# Patient Record
Sex: FEMALE | Race: WHITE | NOT HISPANIC OR LATINO | URBAN - METROPOLITAN AREA
[De-identification: names, ages, dates, MRNs, and addresses within clinical notes are randomized per-mention and may not be internally consistent; named-entity substitution may affect disease eponyms.]

---

## 2017-01-30 ENCOUNTER — APPOINTMENT (RX ONLY)
Dept: URBAN - METROPOLITAN AREA CLINIC 349 | Facility: CLINIC | Age: 72
Setting detail: DERMATOLOGY
End: 2017-01-30

## 2017-01-30 DIAGNOSIS — L57.0 ACTINIC KERATOSIS: ICD-10-CM | Status: IMPROVED

## 2017-01-30 DIAGNOSIS — L85.3 XEROSIS CUTIS: ICD-10-CM

## 2017-01-30 DIAGNOSIS — L81.4 OTHER MELANIN HYPERPIGMENTATION: ICD-10-CM

## 2017-01-30 PROCEDURE — ? OTHER

## 2017-01-30 PROCEDURE — 99213 OFFICE O/P EST LOW 20 MIN: CPT | Mod: 25

## 2017-01-30 PROCEDURE — ? OBSERVATION

## 2017-01-30 PROCEDURE — ? LIQUID NITROGEN (COSMETIC)

## 2017-01-30 PROCEDURE — ? TREATMENT REGIMEN

## 2017-01-30 PROCEDURE — ? COUNSELING

## 2017-01-30 PROCEDURE — 17000 DESTRUCT PREMALG LESION: CPT

## 2017-01-30 PROCEDURE — ? LIQUID NITROGEN

## 2017-01-30 ASSESSMENT — LOCATION DETAILED DESCRIPTION DERM
LOCATION DETAILED: RIGHT LATERAL INFERIOR EYELID
LOCATION DETAILED: RIGHT CENTRAL EYEBROW
LOCATION DETAILED: RIGHT SUPERIOR CENTRAL MALAR CHEEK
LOCATION DETAILED: LEFT LATERAL CANTHUS
LOCATION DETAILED: LEFT LATERAL INFERIOR EYELID
LOCATION DETAILED: LEFT CENTRAL EYEBROW
LOCATION DETAILED: RIGHT INFERIOR FOREHEAD

## 2017-01-30 ASSESSMENT — LOCATION SIMPLE DESCRIPTION DERM
LOCATION SIMPLE: RIGHT CHEEK
LOCATION SIMPLE: RIGHT EYEBROW
LOCATION SIMPLE: LEFT INFERIOR EYELID
LOCATION SIMPLE: RIGHT FOREHEAD
LOCATION SIMPLE: LEFT EYELID
LOCATION SIMPLE: LEFT EYEBROW
LOCATION SIMPLE: RIGHT INFERIOR EYELID

## 2017-01-30 ASSESSMENT — LOCATION ZONE DERM
LOCATION ZONE: FACE
LOCATION ZONE: EYELID

## 2017-01-30 NOTE — PROCEDURE: LIQUID NITROGEN (COSMETIC)
Consent: The patient's consent was obtained including but not limited to risks of crusting, scabbing, blistering, scarring, darker or lighter pigmentary change, recurrence, incomplete removal and infection. The patient understands that the procedure is cosmetic in nature and is not covered by insurance.
Detail Level: Detailed
Post-Care Instructions: I reviewed with the patient in detail post-care instructions. Patient is to wear sunprotection, and avoid picking at any of the treated lesions. Pt may apply Vaseline to crusted or scabbing areas.
Price (Use Numbers Only, No Special Characters Or $): 20.00
Render Post-Care Instructions In Note?: no

## 2017-03-16 ENCOUNTER — HOSPITAL ENCOUNTER (OUTPATIENT)
Dept: PHYSICAL THERAPY | Age: 72
Discharge: HOME | End: 2017-03-16
Payer: MEDICARE

## 2017-03-16 PROCEDURE — 97162 PT EVAL MOD COMPLEX 30 MIN: CPT

## 2017-03-16 PROCEDURE — G8979 MOBILITY GOAL STATUS: HCPCS

## 2017-03-16 PROCEDURE — G8978 MOBILITY CURRENT STATUS: HCPCS

## 2017-03-16 NOTE — PROGRESS NOTES
Ambulatory/Rehab Services H2 Model Falls Risk Assessment    Risk Factor Pts. ·   Confusion/Disorientation/Impulsivity  []    4 ·   Symptomatic Depression  []   2 ·   Altered Elimination  []   1 ·   Dizziness/Vertigo  []   1 ·   Gender (Male)  []   1 ·   Any administered antiepileptics (anticonvulsants):  []   2 ·   Any administered benzodiazepines:  []   1 ·   Visual Impairment (specify):  []   1 ·   Portable Oxygen Use  []   1 ·   Orthostatic ? BP  []   1 ·   History of Recent Falls (within 3 mos.)  [x]   5     Ability to Rise from Chair (choose one) Pts. ·   Ability to rise in a single movement  []   0 ·   Pushes up, successful in one attempt  [x]   1 ·   Multiple attempts, but successful  []   3 ·   Unable to rise without assistance  []   4   Total: (5 or greater = High Risk) 6     Falls Prevention Plan:   []                Physical Limitations to Exercise (specify):   []                Mobility Assistance Device (type):   []                Exercise/Equipment Adaptation (specify):    ©2010 [facility] [address] Patent #[id number].  Federal Law prohibits the replication, distribution or use without written permission from [facility] Certain

## 2017-03-16 NOTE — PROGRESS NOTES
Mike Kimbrough  : 1945 [facility] Center at [facility] DIAMOND CHIRINOS  [address] [facility], [address] [place] [address],[address], [place] [address].  [phone]   [phone]         OUTPATIENT PHYSICAL THERAPY:Initial Assessment 3/16/2017    ICD-10: Treatment Diagnosis: pain in right knee (M25.561)  Precautions/Allergies: no known allergies  Fall Risk Score: 5( (? 5 = High Risk)  MD Orders: evaluate and treat MEDICAL/REFERRING DIAGNOSIS:  knee Tear of medial meniscus, current injury , right knee,  Pain in right knee  DATE OF ONSET: 2017  REFERRING PHYSICIAN: Barak Rangel MD  RETURN PHYSICIAN APPOINTMENT:      INITIAL ASSESSMENT:  Ms. Stacey Miranda presents with pain in the R knee after a fall in January. She was diagnosed with a meniscus tear and a stress fracture per her report. She continues to have limitations in her function. She has not been able to return to her walking program or go up and down stairs reciprocally. She would benefit from PT for problems listed below and guided rehab from this injury. PROBLEM LIST (Impacting functional limitations):  1. Decreased Strength  2. Decreased Ambulation Ability/Technique  3. Increased Pain  4. Decreased Activity Tolerance INTERVENTIONS PLANNED:  1. Balance Exercise  2. Gait Training  3. Home Exercise Program (HEP)  4. Therapeutic Exercise/Strengthening   TREATMENT PLAN:  Effective Dates: 3/16/17 TO 17. Frequency/Duration: 2 times a week for 3 weeks ( Initial order is for 3 weeks but goals written for longer.)  GOALS: (Goals have been discussed and agreed upon with patient.)  Short-Term Functional Goals: Time Frame:  2 weeks  1. Pt independent with HEP to address deficits. 2. Pt to report a decrease in her symptoms. Discharge Goals: Time Frame: 8 weeks  1. Pt will ascend and descend stairs reciprocally with one hand rail. 2. Pt to ambulate greater than 1 mile with no increase in her symptoms.   3. R hip abd, R hip ext and R hip flex WNL to support gait activities. 4. Pt independent with high level HEP to maintain goals made in PT.  5. Pt improve LEFS score by 10 or more points indicating improved function. Rehabilitation Potential For Stated Goals: Good  Regarding Ashley Babb therapy, I certify that the treatment plan above will be carried out by a therapist or under their direction. Thank you for this referral,  Gabby Gaytan, PT     Referring Physician Signature: Oralia Khan MD              Date                    The information in this section was collected on 3/16/17 (except where otherwise noted). HISTORY:   History of Present Injury/Illness (Reason for Referral):  Pt fell in January and injured her knee. She was recovering and returning to function but in Feb began having a pain in her posterior knee and was diagnosed meniscus tear and a stress fracture. She had an injection recently and has had good improvement in knee pain. Since that time she complains of R hip pain and lateral thigh pain. She also mentioned that she thought she was having some sciatica as she was having pain from her low back down into her hip. She was unable to go up/ down steps reciprocally today. Past Medical History/Comorbidities:   Pt has asthma. Social History/Living Environment:     lives alone. No stairs  Prior Level of Function/Work/Activity:  Active in exercise classes and walks 2 miles several times a day. Usually around 6 miles a day. She is retired.     Current Medications:  Zocor, singulair, albuterol      Date Last Reviewed:  3/16/2017   Number of Personal Factors/Comorbidities that affect the Plan of Care: 1-2: MODERATE COMPLEXITY   EXAMINATION:   ROM:          Knee ROM WNL    Strength:          Hip abd R- 3/5  L 4/5        Hip flex  R 4/5  L 4/5        Hip ext  R 3-/5  L 4-/5        Knee ext  R 5/5  L 5/5        Knee flex  R 4/5  L 4+/5        Ant tib-  R 4/5  L 5-/5         Neurological Screen:  R HS and R ant tib strength improved with repeated trunk ext in standing. Myotomes:  See above. Dermatomes:  No numbness or tingling. Intact to lt touch. Body Structures Involved:  1. Bones  2. Joints  3. Muscles Body Functions Affected:  1. Sensory/Pain  2. Neuromusculoskeletal Activities and Participation Affected:  1. Mobility   Number of elements (examined above) that affect the Plan of Care: 4+: HIGH COMPLEXITY   CLINICAL PRESENTATION:   Presentation: Evolving clinical presentation with changing clinical characteristics: MODERATE COMPLEXITY   CLINICAL DECISION MAKING:   Outcome Measure: Tool Used: Lower Extremity Functional Scale (LEFS)  Score:  Initial: 52/80 Most Recent: X/80 (Date: -- )   Interpretation of Score: 20 questions each scored on a 5 point scale with 0 representing \"extreme difficulty or unable to perform\" and 4 representing \"no difficulty\". The lower the score, the greater the functional disability. 80/80 represents no disability. Minimal detectable change is 9 points. Score 80 79-63 62-48 47-32 31-16 15-1 0   Modifier CH CI CJ CK CL CM CN     ? Mobility - Walking and Moving Around:     - CURRENT STATUS: CJ - 20%-39% impaired, limited or restricted    - GOAL STATUS: CI - 1%-19% impaired, limited or restricted    - D/C STATUS:  ---------------To be determined---------------    Medical Necessity:   · Patient is expected to demonstrate progress in strength, coordination and functional technique to return to previous level of function. .  Reason for Services/Other Comments:  · Patient continues to require skilled intervention due to inabilty to participate in walking program as done previously and go up /down stairs rediprocally. .   Use of outcome tool(s) and clinical judgement create a POC that gives a: Questionable prediction of patient's progress: MODERATE COMPLEXITY            TREATMENT:   (In addition to Assessment/Re-Assessment sessions the following treatments were rendered)  Pre-treatment Symptoms/Complaints:  Pt states that pain comes on in about half a mile. Pt did not complain of any pain during evaluation except when going up and down stairs. Pain: Initial:     0/10 Post Session:  0/10     Assessment only today, no treatment provided. Treatment/Session Assessment:    · Response to Treatment:  Eval only today. · Compliance with Program/Exercises: Will assess as treatment progresses. · Recommendations/Intent for next treatment session: \"Next visit will focus on stregnthening, functional activities. \".   Total Treatment Duration:  60 min  PT Patient Time In/Time Out  Time In: 0800  Time Out: 0900    Akiko Pérez PT

## 2017-03-21 ENCOUNTER — HOSPITAL ENCOUNTER (OUTPATIENT)
Dept: PHYSICAL THERAPY | Age: 72
Discharge: HOME | End: 2017-03-21
Payer: MEDICARE

## 2017-03-21 PROCEDURE — 97110 THERAPEUTIC EXERCISES: CPT

## 2017-03-21 NOTE — PROGRESS NOTES
Trista Fitzpatrick  : 1945 Therapy Center at [facility] DIAMOND CHIRINOS  [address] [facility], [address] [place] [address],[address], [place] [address].  [phone]   [phone]         OUTPATIENT PHYSICAL THERAPY:Daily Note 3/21/2017    ICD-10: Treatment Diagnosis: pain in right knee (M25.561)  Precautions/Allergies: no known allergies  Fall Risk Score: 5( (? 5 = High Risk)  MD Orders: evaluate and treat MEDICAL/REFERRING DIAGNOSIS:  knee Tear of medial meniscus, current injury , right knee,  Pain in right knee  DATE OF ONSET: 2017  REFERRING PHYSICIAN: Allison Spangler MD  RETURN PHYSICIAN APPOINTMENT:      INITIAL ASSESSMENT:  Ms. Audie Galindo presents with pain in the R knee after a fall in January. She was diagnosed with a meniscus tear and a stress fracture per her report. She continues to have limitations in her function. She has not been able to return to her walking program or go up and down stairs reciprocally. She would benefit from PT for problems listed below and guided rehab from this injury. PROBLEM LIST (Impacting functional limitations):  1. Decreased Strength  2. Decreased Ambulation Ability/Technique  3. Increased Pain  4. Decreased Activity Tolerance INTERVENTIONS PLANNED:  1. Balance Exercise  2. Gait Training  3. Home Exercise Program (HEP)  4. Therapeutic Exercise/Strengthening   TREATMENT PLAN:  Effective Dates: 3/16/17 TO 17. Frequency/Duration: 2 times a week for 3 weeks ( Initial order is for 3 weeks but goals written for longer.)  GOALS: (Goals have been discussed and agreed upon with patient.)  Short-Term Functional Goals: Time Frame:  2 weeks  1. Pt independent with HEP to address deficits. 2. Pt to report a decrease in her symptoms. Discharge Goals: Time Frame: 8 weeks  1. Pt will ascend and descend stairs reciprocally with one hand rail. 2. Pt to ambulate greater than 1 mile with no increase in her symptoms.   3. R hip abd, R hip ext and R hip flex WNL to support gait activities. 4. Pt independent with high level HEP to maintain goals made in PT.  5. Pt improve LEFS score by 10 or more points indicating improved function. Rehabilitation Potential For Stated Goals: Good  Regarding Valente Hernandez therapy, I certify that the treatment plan above will be carried out by a therapist or under their direction. Thank you for this referral,  Alley Harris, PT     Referring Physician Signature: Allison Spangler MD              Date                    The information in this section was collected on 3/16/17 (except where otherwise noted). HISTORY:   History of Present Injury/Illness (Reason for Referral):  Pt fell in January and injured her knee. She was recovering and returning to function but in Feb began having a pain in her posterior knee and was diagnosed meniscus tear and a stress fracture. She had an injection recently and has had good improvement in knee pain. Since that time she complains of R hip pain and lateral thigh pain. She also mentioned that she thought she was having some sciatica as she was having pain from her low back down into her hip. She was unable to go up/ down steps reciprocally today. Past Medical History/Comorbidities:   Pt has asthma. Social History/Living Environment:     lives alone. No stairs  Prior Level of Function/Work/Activity:  Active in exercise classes and walks 2 miles several times a day. Usually around 6 miles a day. She is retired.     Current Medications:  Zocor, singulair, albuterol      Date Last Reviewed:  3/21/2017   Number of Personal Factors/Comorbidities that affect the Plan of Care: 1-2: MODERATE COMPLEXITY   EXAMINATION:   ROM:          Knee ROM WNL    Strength:          Hip abd R- 3/5  L 4/5        Hip flex  R 4/5  L 4/5        Hip ext  R 3-/5  L 4-/5        Knee ext  R 5/5  L 5/5        Knee flex  R 4/5  L 4+/5        Ant tib-  R 4/5  L 5-/5         Neurological Screen:  R HS and R ant tib strength improved with repeated trunk ext in standing. Myotomes:  See above. Dermatomes:  No numbness or tingling. Intact to lt touch. Body Structures Involved:  1. Bones  2. Joints  3. Muscles Body Functions Affected:  1. Sensory/Pain  2. Neuromusculoskeletal Activities and Participation Affected:  1. Mobility   Number of elements (examined above) that affect the Plan of Care: 4+: HIGH COMPLEXITY   CLINICAL PRESENTATION:   Presentation: Evolving clinical presentation with changing clinical characteristics: MODERATE COMPLEXITY   CLINICAL DECISION MAKING:   Outcome Measure: Tool Used: Lower Extremity Functional Scale (LEFS)  Score:  Initial: 52/80 Most Recent: X/80 (Date: -- )   Interpretation of Score: 20 questions each scored on a 5 point scale with 0 representing \"extreme difficulty or unable to perform\" and 4 representing \"no difficulty\". The lower the score, the greater the functional disability. 80/80 represents no disability. Minimal detectable change is 9 points. Score 80 79-63 62-48 47-32 31-16 15-1 0   Modifier CH CI CJ CK CL CM CN     ? Mobility - Walking and Moving Around:     - CURRENT STATUS: CJ - 20%-39% impaired, limited or restricted    - GOAL STATUS: CI - 1%-19% impaired, limited or restricted    - D/C STATUS:  ---------------To be determined---------------    Medical Necessity:   · Patient is expected to demonstrate progress in strength, coordination and functional technique to return to previous level of function. .  Reason for Services/Other Comments:  · Patient continues to require skilled intervention due to inabilty to participate in walking program as done previously and go up /down stairs rediprocally. .   Use of outcome tool(s) and clinical judgement create a POC that gives a: Questionable prediction of patient's progress: MODERATE COMPLEXITY            TREATMENT:   (In addition to Assessment/Re-Assessment sessions the following treatments were rendered)  Pre-treatment Symptoms/Complaints:  Pt states she is not having any pain. She states she has not had the pain behind her knee or in her post thigh since she began doing the exercises. Pain: Initial:     0/10 Post Session:  0/10     Therapeutic Exercise: ( )45 min:  Exercises per grid below to improve mobility and strength. Required minimal visual, verbal and tactile cues to promote proper body alignment, promote proper body posture and promote proper body mechanics. Progressed repetitions as indicated. Date:  3/21/17 Date:   Date:     Activity/Exercise Parameters Parameters Parameters   SLR 3 directions  2 x 15     Glut lifts 1 x 15     Bicycle 5 min level 3     HS Nautilus 25# x 15  30# x 15     bridging 2 x 15     Hip abd in supine 15                 Treatment/Session Assessment:    · Response to Treatment:  DId well with exercises. Hips are weak L>R. Wes Fregosok Requires manual assist for several ex. · Compliance with Program/Exercises: Will assess as treatment progresses. · Recommendations/Intent for next treatment session: \"Next visit will focus on stregnthening, functional activities. \".   Total Treatment Duration:  45 min       Mariela Santo PT

## 2017-03-23 ENCOUNTER — HOSPITAL ENCOUNTER (OUTPATIENT)
Dept: PHYSICAL THERAPY | Age: 72
Discharge: HOME | End: 2017-03-23
Payer: MEDICARE

## 2017-03-23 PROCEDURE — 97110 THERAPEUTIC EXERCISES: CPT

## 2017-03-23 NOTE — PROGRESS NOTES
Dc Ta  : 1945 Therapy Center at [facility] DIAMOND CHIRINOS  [address] [facility], [address] [place] [address],[address], [place] [address].  [phone]   [phone]         OUTPATIENT PHYSICAL THERAPY:Daily Note 3/23/2017    ICD-10: Treatment Diagnosis: pain in right knee (M25.561)  Precautions/Allergies: no known allergies  Fall Risk Score: 5( (? 5 = High Risk)  MD Orders: evaluate and treat MEDICAL/REFERRING DIAGNOSIS:  knee Tear of medial meniscus, current injury , right knee,  Pain in right knee  DATE OF ONSET: 2017  REFERRING PHYSICIAN: Anson Roblero MD  RETURN PHYSICIAN APPOINTMENT:      INITIAL ASSESSMENT:  Ms. Jennifer Castro presents with pain in the R knee after a fall in January. She was diagnosed with a meniscus tear and a stress fracture per her report. She continues to have limitations in her function. She has not been able to return to her walking program or go up and down stairs reciprocally. She would benefit from PT for problems listed below and guided rehab from this injury. PROBLEM LIST (Impacting functional limitations):  1. Decreased Strength  2. Decreased Ambulation Ability/Technique  3. Increased Pain  4. Decreased Activity Tolerance INTERVENTIONS PLANNED:  1. Balance Exercise  2. Gait Training  3. Home Exercise Program (HEP)  4. Therapeutic Exercise/Strengthening   TREATMENT PLAN:  Effective Dates: 3/16/17 TO 17. Frequency/Duration: 2 times a week for 3 weeks ( Initial order is for 3 weeks but goals written for longer.)  GOALS: (Goals have been discussed and agreed upon with patient.)  Short-Term Functional Goals: Time Frame:  2 weeks  1. Pt independent with HEP to address deficits. 2. Pt to report a decrease in her symptoms. Discharge Goals: Time Frame: 8 weeks  1. Pt will ascend and descend stairs reciprocally with one hand rail. 2. Pt to ambulate greater than 1 mile with no increase in her symptoms.   3. R hip abd, R hip ext and R hip flex WNL to support gait activities. 4. Pt independent with high level HEP to maintain goals made in PT.  5. Pt improve LEFS score by 10 or more points indicating improved function. Rehabilitation Potential For Stated Goals: Good  Regarding Bhanu Paulson therapy, I certify that the treatment plan above will be carried out by a therapist or under their direction. Thank you for this referral,  Rosalind Rodrigues PT     Referring Physician Signature: Jaycee Suggs MD              Date                    The information in this section was collected on 3/16/17 (except where otherwise noted). HISTORY:   History of Present Injury/Illness (Reason for Referral):  Pt fell in January and injured her knee. She was recovering and returning to function but in Feb began having a pain in her posterior knee and was diagnosed meniscus tear and a stress fracture. She had an injection recently and has had good improvement in knee pain. Since that time she complains of R hip pain and lateral thigh pain. She also mentioned that she thought she was having some sciatica as she was having pain from her low back down into her hip. She was unable to go up/ down steps reciprocally today. Past Medical History/Comorbidities:   Pt has asthma. Social History/Living Environment:     lives alone. No stairs  Prior Level of Function/Work/Activity:  Active in exercise classes and walks 2 miles several times a day. Usually around 6 miles a day. She is retired.     Current Medications:  Zocor, singulair, albuterol      Date Last Reviewed:  3/23/2017   Number of Personal Factors/Comorbidities that affect the Plan of Care: 1-2: MODERATE COMPLEXITY   EXAMINATION:   ROM:          Knee ROM WNL    Strength:          Hip abd R- 3/5  L 4/5        Hip flex  R 4/5  L 4/5        Hip ext  R 3-/5  L 4-/5        Knee ext  R 5/5  L 5/5        Knee flex  R 4/5  L 4+/5        Ant tib-  R 4/5  L 5-/5         Neurological Screen:  R HS and R ant tib strength improved with repeated trunk ext in standing. Myotomes:  See above. Dermatomes:  No numbness or tingling. Intact to lt touch. Body Structures Involved:  1. Bones  2. Joints  3. Muscles Body Functions Affected:  1. Sensory/Pain  2. Neuromusculoskeletal Activities and Participation Affected:  1. Mobility   Number of elements (examined above) that affect the Plan of Care: 4+: HIGH COMPLEXITY   CLINICAL PRESENTATION:   Presentation: Evolving clinical presentation with changing clinical characteristics: MODERATE COMPLEXITY   CLINICAL DECISION MAKING:   Outcome Measure: Tool Used: Lower Extremity Functional Scale (LEFS)  Score:  Initial: 52/80 Most Recent: X/80 (Date: -- )   Interpretation of Score: 20 questions each scored on a 5 point scale with 0 representing \"extreme difficulty or unable to perform\" and 4 representing \"no difficulty\". The lower the score, the greater the functional disability. 80/80 represents no disability. Minimal detectable change is 9 points. Score 80 79-63 62-48 47-32 31-16 15-1 0   Modifier CH CI CJ CK CL CM CN     ? Mobility - Walking and Moving Around:     - CURRENT STATUS: CJ - 20%-39% impaired, limited or restricted    - GOAL STATUS: CI - 1%-19% impaired, limited or restricted    - D/C STATUS:  ---------------To be determined---------------    Medical Necessity:   · Patient is expected to demonstrate progress in strength, coordination and functional technique to return to previous level of function. .  Reason for Services/Other Comments:  · Patient continues to require skilled intervention due to inabilty to participate in walking program as done previously and go up /down stairs rediprocally. .   Use of outcome tool(s) and clinical judgement create a POC that gives a: Questionable prediction of patient's progress: MODERATE COMPLEXITY            TREATMENT:   (In addition to Assessment/Re-Assessment sessions the following treatments were rendered)  Pre-treatment Symptoms/Complaints:  Pt states she is not having any pain. She states she is not having any pain. Pain: Initial:     0/10 Post Session:  0/10 She complained of pain with hip abd but seemed to be muscular pain. Therapeutic Exercise: ( )45 min:  Exercises per grid below to improve mobility and strength. Required minimal visual, verbal and tactile cues to promote proper body alignment, promote proper body posture and promote proper body mechanics. Progressed repetitions as indicated. Date:  3/21/17 Date:  3/23/17 Date:     Activity/Exercise Parameters Parameters Parameters   SLR 3 directions  2 x 15 SLR 1#  2 x 10  Hip abd requires assist  2 x10  Hip ext 2 x 10    Glut lifts 1 x 15 1 x 15    Bicycle 5 min level 3 5 min level 3    HS Nautilus 25# x 15  30# x 15 Nautilus 30# x 15    bridging 2 x 15     Hip abd in supine 15 With band 15x    Shuttle  50# 2 x 10    Step ups  4' x 10    Single leg stance  Foam  3 x 30sec          Treatment/Session Assessment:    · Response to Treatment:  DId well with exercises. Pt has marked weakness in her hips R>L. She requires manual assist for several exercises and compensates when not monitored. · Compliance with Program/Exercises: compliant with ex at home. · Recommendations/Intent for next treatment session: \"Next visit will focus on stregnthening, functional activities. \".   Total Treatment Duration:  45 min  PT Patient Time In/Time Out  Time In: 0800  Time Out: 4052    Tere Cavazos, PT

## 2017-03-28 ENCOUNTER — HOSPITAL ENCOUNTER (OUTPATIENT)
Dept: PHYSICAL THERAPY | Age: 72
Discharge: HOME | End: 2017-03-28
Payer: MEDICARE

## 2017-03-28 PROCEDURE — 97110 THERAPEUTIC EXERCISES: CPT

## 2017-03-28 NOTE — PROGRESS NOTES
Ron Saini  : 1945 [facility] DIAMOND CHIRINOS  [address] [facility], [address] [place] [address],[address], [place] [address].  [phone]   [phone]         OUTPATIENT PHYSICAL THERAPY:Daily Note 3/28/2017    ICD-10: Treatment Diagnosis: pain in right knee (M25.561)  Precautions/Allergies: no known allergies  Fall Risk Score: 5( (? 5 = High Risk)  MD Orders: evaluate and treat MEDICAL/REFERRING DIAGNOSIS:  knee Tear of medial meniscus, current injury , right knee,  Pain in right knee  DATE OF ONSET: 2017  REFERRING PHYSICIAN: Kristin Chowdary MD  RETURN PHYSICIAN APPOINTMENT:      INITIAL ASSESSMENT:  Ms. Christa Villeda presents with pain in the R knee after a fall in January. She was diagnosed with a meniscus tear and a stress fracture per her report. She continues to have limitations in her function. She has not been able to return to her walking program or go up and down stairs reciprocally. She would benefit from PT for problems listed below and guided rehab from this injury. PROBLEM LIST (Impacting functional limitations):  1. Decreased Strength  2. Decreased Ambulation Ability/Technique  3. Increased Pain  4. Decreased Activity Tolerance INTERVENTIONS PLANNED:  1. Balance Exercise  2. Gait Training  3. Home Exercise Program (HEP)  4. Therapeutic Exercise/Strengthening   TREATMENT PLAN:  Effective Dates: 3/16/17 TO 17. Frequency/Duration: 2 times a week for 3 weeks ( Initial order is for 3 weeks but goals written for longer.)  GOALS: (Goals have been discussed and agreed upon with patient.)  Short-Term Functional Goals: Time Frame:  2 weeks  1. Pt independent with HEP to address deficits. 2. Pt to report a decrease in her symptoms. Discharge Goals: Time Frame: 8 weeks  1. Pt will ascend and descend stairs reciprocally with one hand rail. 2. Pt to ambulate greater than 1 mile with no increase in her symptoms.   3. R hip abd, R hip ext and R hip flex WNL to support gait activities. 4. Pt independent with high level HEP to maintain goals made in PT.  5. Pt improve LEFS score by 10 or more points indicating improved function. Rehabilitation Potential For Stated Goals: Good  Regarding Kady sorto, I certify that the treatment plan above will be carried out by a therapist or under their direction. Thank you for this referral,  Sergo Arita, PT     Referring Physician Signature: Jules Uribe MD              Date                    The information in this section was collected on 3/16/17 (except where otherwise noted). HISTORY:   History of Present Injury/Illness (Reason for Referral):  Pt fell in January and injured her knee. She was recovering and returning to function but in Feb began having a pain in her posterior knee and was diagnosed meniscus tear and a stress fracture. She had an injection recently and has had good improvement in knee pain. Since that time she complains of R hip pain and lateral thigh pain. She also mentioned that she thought she was having some sciatica as she was having pain from her low back down into her hip. She was unable to go up/ down steps reciprocally today. Past Medical History/Comorbidities:   Pt has asthma. Social History/Living Environment:     lives alone. No stairs  Prior Level of Function/Work/Activity:  Active in exercise classes and walks 2 miles several times a day. Usually around 6 miles a day. She is retired.     Current Medications:  Zocor, singulair, albuterol      Date Last Reviewed:  3/28/2017   Number of Personal Factors/Comorbidities that affect the Plan of Care: 1-2: MODERATE COMPLEXITY   EXAMINATION:   ROM:          Knee ROM WNL    Strength:          Hip abd R- 3/5  L 4/5        Hip flex  R 4/5  L 4/5        Hip ext  R 3-/5  L 4-/5        Knee ext  R 5/5  L 5/5        Knee flex  R 4/5  L 4+/5        Ant tib-  R 4/5  L 5-/5         Neurological Screen:  R HS and R ant tib strength improved with repeated trunk ext in standing. Myotomes:  See above. Dermatomes:  No numbness or tingling. Intact to lt touch. Body Structures Involved:  1. Bones  2. Joints  3. Muscles Body Functions Affected:  1. Sensory/Pain  2. Neuromusculoskeletal Activities and Participation Affected:  1. Mobility   Number of elements (examined above) that affect the Plan of Care: 4+: HIGH COMPLEXITY   CLINICAL PRESENTATION:   Presentation: Evolving clinical presentation with changing clinical characteristics: MODERATE COMPLEXITY   CLINICAL DECISION MAKING:   Outcome Measure: Tool Used: Lower Extremity Functional Scale (LEFS)  Score:  Initial: 52/80 Most Recent: X/80 (Date: -- )   Interpretation of Score: 20 questions each scored on a 5 point scale with 0 representing \"extreme difficulty or unable to perform\" and 4 representing \"no difficulty\". The lower the score, the greater the functional disability. 80/80 represents no disability. Minimal detectable change is 9 points. Score 80 79-63 62-48 47-32 31-16 15-1 0   Modifier CH CI CJ CK CL CM CN     ? Mobility - Walking and Moving Around:     - CURRENT STATUS: CJ - 20%-39% impaired, limited or restricted    - GOAL STATUS: CI - 1%-19% impaired, limited or restricted    - D/C STATUS:  ---------------To be determined---------------    Medical Necessity:   · Patient is expected to demonstrate progress in strength, coordination and functional technique to return to previous level of function. .  Reason for Services/Other Comments:  · Patient continues to require skilled intervention due to inabilty to participate in walking program as done previously and go up /down stairs rediprocally. .   Use of outcome tool(s) and clinical judgement create a POC that gives a: Questionable prediction of patient's progress: MODERATE COMPLEXITY            TREATMENT:   (In addition to Assessment/Re-Assessment sessions the following treatments were rendered)  Pre-treatment Symptoms/Complaints:  Pt states she is not having any pain. She states she is not having any pain. Pain: Initial:     1/10 Post Session:  0/10 She complained of pain with hip abd but seemed to be muscular pain. Therapeutic Exercise: ( )45 min:  Exercises per grid below to improve mobility and strength. Required minimal visual, verbal and tactile cues to promote proper body alignment, promote proper body posture and promote proper body mechanics. Progressed repetitions as indicated. Date:  3/21/17 Date:  3/23/17 Date:  3/28   Activity/Exercise Parameters Parameters Parameters   SLR 3 directions  2 x 15 SLR 1#  2 x 10  Hip abd requires assist  2 x10  Hip ext 2 x 10 SLR 1# 2 x 15  Hip abd 0# 2 x 10  Hip ext 2 x 10   Glut lifts 1 x 15 1 x 15 2 x 15   Bicycle 5 min level 3 5 min level 3 5 min level 3   HS Nautilus 25# x 15  30# x 15 Nautilus 30# x 15 Nautilus 40# 2 x 15   bridging 2 x 15     Hip abd in supine 15 With band 15x Blue bland 2 x 15   Shuttle  50# 2 x 10 50# 2 x 10   Step ups  4' x 10 4' x10  4' stepovers x 10   Single leg stance  Foam  3 x 30sec          Treatment/Session Assessment:    · Response to Treatment:  DId well with exercises. Required less assist with abduction today. · Compliance with Program/Exercises: compliant with ex at home. · Recommendations/Intent for next treatment session: \"Next visit will focus on stregnthening, functional activities. \".   Total Treatment Duration:  45 min  PT Patient Time In/Time Out  Time In: 0800  Time Out: 2304    Valdemar Lynn, PT

## 2017-03-30 ENCOUNTER — HOSPITAL ENCOUNTER (OUTPATIENT)
Dept: PHYSICAL THERAPY | Age: 72
Discharge: HOME | End: 2017-03-30
Payer: MEDICARE

## 2017-03-30 PROCEDURE — 97110 THERAPEUTIC EXERCISES: CPT

## 2017-03-30 NOTE — PROGRESS NOTES
Doroteo Moseley  : 1945 [facility]OND CHIRIN[facility]  [address] [facility], [address] [place] [address],[address], [place] [address].  [phone]   [phone]         OUTPATIENT PHYSICAL THERAPY:Daily Note 3/30/2017    ICD-10: Treatment Diagnosis: pain in right knee (M25.561)  Precautions/Allergies: no known allergies  Fall Risk Score: 5( (? 5 = High Risk)  MD Orders: evaluate and treat MEDICAL/REFERRING DIAGNOSIS:  knee Tear of medial meniscus, current injury , right knee,  Pain in right knee  DATE OF ONSET: 2017  REFERRING PHYSICIAN: Will Ramos MD  RETURN PHYSICIAN APPOINTMENT:      INITIAL ASSESSMENT:  Ms. Laurence Ayala presents with pain in the R knee after a fall in January. She was diagnosed with a meniscus tear and a stress fracture per her report. She continues to have limitations in her function. She has not been able to return to her walking program or go up and down stairs reciprocally. She would benefit from PT for problems listed below and guided rehab from this injury. PROBLEM LIST (Impacting functional limitations):  1. Decreased Strength  2. Decreased Ambulation Ability/Technique  3. Increased Pain  4. Decreased Activity Tolerance INTERVENTIONS PLANNED:  1. Balance Exercise  2. Gait Training  3. Home Exercise Program (HEP)  4. Therapeutic Exercise/Strengthening   TREATMENT PLAN:  Effective Dates: 3/16/17 TO 17. Frequency/Duration: 2 times a week for 3 weeks ( Initial order is for 3 weeks but goals written for longer.)  GOALS: (Goals have been discussed and agreed upon with patient.)  Short-Term Functional Goals: Time Frame:  2 weeks  1. Pt independent with HEP to address deficits. 2. Pt to report a decrease in her symptoms. Discharge Goals: Time Frame: 8 weeks  1. Pt will ascend and descend stairs reciprocally with one hand rail. 2. Pt to ambulate greater than 1 mile with no increase in her symptoms.   3. R hip abd, R hip ext and R hip flex WNL to support gait activities. 4. Pt independent with high level HEP to maintain goals made in PT.  5. Pt improve LEFS score by 10 or more points indicating improved function. Rehabilitation Potential For Stated Goals: Good  Regarding Wilmar Valdivia therapy, I certify that the treatment plan above will be carried out by a therapist or under their direction. Thank you for this referral,  Brianna Tam, PT     Referring Physician Signature: Pita Collins MD              Date                    The information in this section was collected on 3/16/17 (except where otherwise noted). HISTORY:   History of Present Injury/Illness (Reason for Referral):  Pt fell in January and injured her knee. She was recovering and returning to function but in Feb began having a pain in her posterior knee and was diagnosed meniscus tear and a stress fracture. She had an injection recently and has had good improvement in knee pain. Since that time she complains of R hip pain and lateral thigh pain. She also mentioned that she thought she was having some sciatica as she was having pain from her low back down into her hip. She was unable to go up/ down steps reciprocally today. Past Medical History/Comorbidities:   Pt has asthma. Social History/Living Environment:     lives alone. No stairs  Prior Level of Function/Work/Activity:  Active in exercise classes and walks 2 miles several times a day. Usually around 6 miles a day. She is retired.     Current Medications:  Zocor, singulair, albuterol      Date Last Reviewed:  3/30/2017   Number of Personal Factors/Comorbidities that affect the Plan of Care: 1-2: MODERATE COMPLEXITY   EXAMINATION:   ROM:          Knee ROM WNL    Strength:          Hip abd R- 3/5  L 4/5        Hip flex  R 4/5  L 4/5        Hip ext  R 3-/5  L 4-/5        Knee ext  R 5/5  L 5/5        Knee flex  R 4/5  L 4+/5        Ant tib-  R 4/5  L 5-/5         Neurological Screen:  R HS and R ant tib strength improved with repeated trunk ext in standing. Myotomes:  See above. Dermatomes:  No numbness or tingling. Intact to lt touch. Body Structures Involved:  1. Bones  2. Joints  3. Muscles Body Functions Affected:  1. Sensory/Pain  2. Neuromusculoskeletal Activities and Participation Affected:  1. Mobility   Number of elements (examined above) that affect the Plan of Care: 4+: HIGH COMPLEXITY   CLINICAL PRESENTATION:   Presentation: Evolving clinical presentation with changing clinical characteristics: MODERATE COMPLEXITY   CLINICAL DECISION MAKING:   Outcome Measure: Tool Used: Lower Extremity Functional Scale (LEFS)  Score:  Initial: 52/80 Most Recent: X/80 (Date: -- )   Interpretation of Score: 20 questions each scored on a 5 point scale with 0 representing \"extreme difficulty or unable to perform\" and 4 representing \"no difficulty\". The lower the score, the greater the functional disability. 80/80 represents no disability. Minimal detectable change is 9 points. Score 80 79-63 62-48 47-32 31-16 15-1 0   Modifier CH CI CJ CK CL CM CN     ? Mobility - Walking and Moving Around:     - CURRENT STATUS: CJ - 20%-39% impaired, limited or restricted    - GOAL STATUS: CI - 1%-19% impaired, limited or restricted    - D/C STATUS:  ---------------To be determined---------------    Medical Necessity:   · Patient is expected to demonstrate progress in strength, coordination and functional technique to return to previous level of function. .  Reason for Services/Other Comments:  · Patient continues to require skilled intervention due to inabilty to participate in walking program as done previously and go up /down stairs rediprocally. .   Use of outcome tool(s) and clinical judgement create a POC that gives a: Questionable prediction of patient's progress: MODERATE COMPLEXITY            TREATMENT:   (In addition to Assessment/Re-Assessment sessions the following treatments were rendered)  Pre-treatment Symptoms/Complaints:  Pt states she is not having any pain. She states she is not having any pain. Pain: Initial:     0/10 Post Session:  0/10 She complained of pain with hip abd but seemed to be muscular pain. Therapeutic Exercise: ( )45 min:  Exercises per grid below to improve mobility and strength. Required minimal visual, verbal and tactile cues to promote proper body alignment, promote proper body posture and promote proper body mechanics. Progressed repetitions as indicated. Date:  3/21/17 Date:  3/23/17 Date:  3/28 Date  3/30   Activity/Exercise Parameters Parameters Parameters    SLR 3 directions  2 x 15 SLR 1#  2 x 10  Hip abd requires assist  2 x10  Hip ext 2 x 10 SLR 1# 2 x 15  Hip abd 0# 2 x 10  Hip ext 2 x 10 SLR 2# 2 x 15  Abd 0# 2 x 10 with assist and then 1 x 10 against wall  Hip ext 2# 2 x10   Glut lifts 1 x 15 1 x 15 2 x 15 2# 2 x15   Bicycle 5 min level 3 5 min level 3 5 min level 3 5 min level 3   HS Nautilus 25# x 15  30# x 15 Nautilus 30# x 15 Nautilus 40# 2 x 15 Nautilus 40#  2 x 15   bridging 2 x 15      Hip abd in supine 15 With band 15x Blue bland 2 x 15    Shuttle  50# 2 x 10 50# 2 x 10 50# 2 x 10   Step ups  4' x 10 4' x10  4' stepovers x 10 6' 2 x 10   Single leg stance  Foam  3 x 30sec         Treatment/Session Assessment:    · Response to Treatment:  She is not having any pain . She has a difficult time accessing her gluts for abduction. Focused on ways that would limit compensation at home with HEP. · Compliance with Program/Exercises: compliant with ex at home. · Recommendations/Intent for next treatment session: \"Next visit will focus on stregnthening, functional activities. \".   Total Treatment Duration:  45 min  PT Patient Time In/Time Out  Time In: 0800  Time Out: 1601    Karely Hubbard, PT

## 2017-04-04 ENCOUNTER — APPOINTMENT (OUTPATIENT)
Dept: PHYSICAL THERAPY | Age: 72
End: 2017-04-04
Payer: MEDICARE

## 2017-04-06 ENCOUNTER — HOSPITAL ENCOUNTER (OUTPATIENT)
Dept: PHYSICAL THERAPY | Age: 72
Discharge: HOME | End: 2017-04-06
Payer: MEDICARE

## 2017-04-06 PROCEDURE — 97110 THERAPEUTIC EXERCISES: CPT

## 2017-04-06 NOTE — PROGRESS NOTES
Chris Winslow  : 1945 [facility]  [address] [facility], [address] [place] [address],[address], [place] [address].  [phone]   [phone]         OUTPATIENT PHYSICAL THERAPY:Daily Note 2017    ICD-10: Treatment Diagnosis: pain in right knee (M25.561)  Precautions/Allergies: no known allergies  Fall Risk Score: 5( (? 5 = High Risk)  MD Orders: evaluate and treat MEDICAL/REFERRING DIAGNOSIS:  knee Tear of medial meniscus, current injury , right knee,  Pain in right knee  DATE OF ONSET: 2017  REFERRING PHYSICIAN: Maxwell Ernandez MD  RETURN PHYSICIAN APPOINTMENT:      INITIAL ASSESSMENT:  Ms. Angie Denis presents with pain in the R knee after a fall in January. She was diagnosed with a meniscus tear and a stress fracture per her report. She continues to have limitations in her function. She has not been able to return to her walking program or go up and down stairs reciprocally. She would benefit from PT for problems listed below and guided rehab from this injury. PROBLEM LIST (Impacting functional limitations):  1. Decreased Strength  2. Decreased Ambulation Ability/Technique  3. Increased Pain  4. Decreased Activity Tolerance INTERVENTIONS PLANNED:  1. Balance Exercise  2. Gait Training  3. Home Exercise Program (HEP)  4. Therapeutic Exercise/Strengthening   TREATMENT PLAN:  Effective Dates: 3/16/17 TO 17. Frequency/Duration: 2 times a week for 3 weeks ( Initial order is for 3 weeks but goals written for longer.)  GOALS: (Goals have been discussed and agreed upon with patient.)  Short-Term Functional Goals: Time Frame:  2 weeks  1. Pt independent with HEP to address deficits. MET  17  2. Pt to report a decrease in her symptoms. MET 17  Discharge Goals: Time Frame: 8 weeks  1. Pt will ascend and descend stairs reciprocally with one hand rail. 2. Pt to ambulate greater than 1 mile with no increase in her symptoms.   3. R hip abd, R hip ext and R hip flex WNL to support gait activities. 4. Pt independent with high level HEP to maintain goals made in PT.  5. Pt improve LEFS score by 10 or more points indicating improved function. Rehabilitation Potential For Stated Goals: Good  Regarding Prateek Liraelman therapy, I certify that the treatment plan above will be carried out by a therapist or under their direction. Thank you for this referral,  Cheyanne Jensen, PT     Referring Physician Signature: Peyton Humphrey MD              Date                    The information in this section was collected on 3/16/17 (except where otherwise noted). HISTORY:   History of Present Injury/Illness (Reason for Referral):  Pt fell in January and injured her knee. She was recovering and returning to function but in Feb began having a pain in her posterior knee and was diagnosed meniscus tear and a stress fracture. She had an injection recently and has had good improvement in knee pain. Since that time she complains of R hip pain and lateral thigh pain. She also mentioned that she thought she was having some sciatica as she was having pain from her low back down into her hip. She was unable to go up/ down steps reciprocally today. Past Medical History/Comorbidities:   Pt has asthma. Social History/Living Environment:     lives alone. No stairs  Prior Level of Function/Work/Activity:  Active in exercise classes and walks 2 miles several times a day. Usually around 6 miles a day. She is retired.     Current Medications:  Zocor, singulair, albuterol      Date Last Reviewed:  4/6/2017   Number of Personal Factors/Comorbidities that affect the Plan of Care: 1-2: MODERATE COMPLEXITY   EXAMINATION:   ROM:          Knee ROM WNL    Strength:          Hip abd R- 3/5  L 4/5        Hip flex  R 4/5  L 4/5        Hip ext  R 3-/5  L 4-/5        Knee ext  R 5/5  L 5/5        Knee flex  R 4/5  L 4+/5        Ant tib-  R 4/5  L 5-/5         Neurological Screen:  R HS and R ant tib strength improved with repeated trunk ext in standing. Myotomes:  See above. Dermatomes:  No numbness or tingling. Intact to lt touch. Body Structures Involved:  1. Bones  2. Joints  3. Muscles Body Functions Affected:  1. Sensory/Pain  2. Neuromusculoskeletal Activities and Participation Affected:  1. Mobility   Number of elements (examined above) that affect the Plan of Care: 4+: HIGH COMPLEXITY   CLINICAL PRESENTATION:   Presentation: Evolving clinical presentation with changing clinical characteristics: MODERATE COMPLEXITY   CLINICAL DECISION MAKING:   Outcome Measure: Tool Used: Lower Extremity Functional Scale (LEFS)  Score:  Initial: 52/80 Most Recent: X/80 (Date: -- )   Interpretation of Score: 20 questions each scored on a 5 point scale with 0 representing \"extreme difficulty or unable to perform\" and 4 representing \"no difficulty\". The lower the score, the greater the functional disability. 80/80 represents no disability. Minimal detectable change is 9 points. Score 80 79-63 62-48 47-32 31-16 15-1 0   Modifier CH CI CJ CK CL CM CN     ? Mobility - Walking and Moving Around:     - CURRENT STATUS: CJ - 20%-39% impaired, limited or restricted    - GOAL STATUS: CI - 1%-19% impaired, limited or restricted    - D/C STATUS:  ---------------To be determined---------------    Medical Necessity:   · Patient is expected to demonstrate progress in strength, coordination and functional technique to return to previous level of function. .  Reason for Services/Other Comments:  · Patient continues to require skilled intervention due to inabilty to participate in walking program as done previously and go up /down stairs rediprocally. .   Use of outcome tool(s) and clinical judgement create a POC that gives a: Questionable prediction of patient's progress: MODERATE COMPLEXITY            TREATMENT:   (In addition to Assessment/Re-Assessment sessions the following treatments were rendered)  Pre-treatment Symptoms/Complaints:  Pt   Pain: Initial:     0/10 Post Session:   No pain after session     Therapeutic Exercise: ( )45 min:  Exercises per grid below to improve mobility and strength. Required minimal visual, verbal and tactile cues to promote proper body alignment, promote proper body posture and promote proper body mechanics. Progressed repetitions as indicated. Date:  3/21/17 Date:  3/23/17 Date:  3/28 Date  3/30 Date  4/7/17   Activity/Exercise Parameters Parameters Parameters     SLR 3 directions  2 x 15 SLR 1#  2 x 10  Hip abd requires assist  2 x10  Hip ext 2 x 10 SLR 1# 2 x 15  Hip abd 0# 2 x 10  Hip ext 2 x 10 SLR 2# 2 x 15  Abd 0# 2 x 10 with assist and then 1 x 10 against wall  Hip ext 2# 2 x10 SLR 2# 2 x 15  Abs 0# 2 x 10  Hip ext 2# 2 x15   Glut lifts 1 x 15 1 x 15 2 x 15 2# 2 x15    Bicycle 5 min level 3 5 min level 3 5 min level 3 5 min level 3 5 min level 3   HS Nautilus 25# x 15  30# x 15 Nautilus 30# x 15 Nautilus 40# 2 x 15 Nautilus 40#  2 x 15 Nautilus 45# 2 x 15   bridging 2 x 15       Hip abd in supine 15 With band 15x Blue bland 2 x 15  Clamshell blue band 2 x 15   Shuttle  50# 2 x 10 50# 2 x 10 50# 2 x 10 62# 2 x 15   Step ups  4' x 10 4' x10  4' stepovers x 10 6' 2 x 10 6' 2 x 10   Single leg stance  Foam  3 x 30sec          Treatment/Session Assessment:    · Response to Treatment:  She is not having any pain . Her hip abductor strength is improving. · Compliance with Program/Exercises: compliant with ex at home. · Recommendations/Intent for next treatment session: \"Next visit will focus on stregnthening, functional activities. \".   Total Treatment Duration:  45 min       Sergo Arita, PT

## 2017-04-25 ENCOUNTER — HOSPITAL ENCOUNTER (OUTPATIENT)
Dept: PHYSICAL THERAPY | Age: 72
Discharge: HOME | End: 2017-04-25
Payer: MEDICARE

## 2017-04-25 PROCEDURE — 97110 THERAPEUTIC EXERCISES: CPT

## 2017-04-25 NOTE — PROGRESS NOTES
Radha Juarez  : 1945 [facility]  [address] [facility], [address] [place] [address],[address], [place] [address].  [phone]   [phone]         OUTPATIENT PHYSICAL THERAPY:Daily Note 2017    ICD-10: Treatment Diagnosis: pain in right knee (M25.561)  Precautions/Allergies: no known allergies  Fall Risk Score: 5( (? 5 = High Risk)  MD Orders: evaluate and treat MEDICAL/REFERRING DIAGNOSIS:  knee Tear of medial meniscus, current injury , right knee,  Pain in right knee  DATE OF ONSET: 2017  REFERRING PHYSICIAN: Peyton Humphrey MD  RETURN PHYSICIAN APPOINTMENT:      INITIAL ASSESSMENT:  Ms. Maria G Bryant presents with pain in the R knee after a fall in January. She was diagnosed with a meniscus tear and a stress fracture per her report. She continues to have limitations in her function. She has not been able to return to her walking program or go up and down stairs reciprocally. She would benefit from PT for problems listed below and guided rehab from this injury. PROBLEM LIST (Impacting functional limitations):  1. Decreased Strength  2. Decreased Ambulation Ability/Technique  3. Increased Pain  4. Decreased Activity Tolerance INTERVENTIONS PLANNED:  1. Balance Exercise  2. Gait Training  3. Home Exercise Program (HEP)  4. Therapeutic Exercise/Strengthening   TREATMENT PLAN:  Effective Dates: 3/16/17 TO 17. Frequency/Duration: 2 times a week for 3 weeks ( Initial order is for 3 weeks but goals written for longer.)  GOALS: (Goals have been discussed and agreed upon with patient.)  Short-Term Functional Goals: Time Frame:  2 weeks  1. Pt independent with HEP to address deficits. MET  17  2. Pt to report a decrease in her symptoms. MET 17  Discharge Goals: Time Frame: 8 weeks  1. Pt will ascend and descend stairs reciprocally with one hand rail. 2. Pt to ambulate greater than 1 mile with no increase in her symptoms.   3. R hip abd, R hip ext and R hip flex WNL to support gait activities. 4. Pt independent with high level HEP to maintain goals made in PT.  5. Pt improve LEFS score by 10 or more points indicating improved function. Rehabilitation Potential For Stated Goals: Good  Regarding Mendez Christianson therapy, I certify that the treatment plan above will be carried out by a therapist or under their direction. Thank you for this referral,  Norman Tavares, PT     Referring Physician Signature: Chriss Hernandez MD              Date                    The information in this section was collected on 3/16/17 (except where otherwise noted). HISTORY:   History of Present Injury/Illness (Reason for Referral):  Pt fell in January and injured her knee. She was recovering and returning to function but in Feb began having a pain in her posterior knee and was diagnosed meniscus tear and a stress fracture. She had an injection recently and has had good improvement in knee pain. Since that time she complains of R hip pain and lateral thigh pain. She also mentioned that she thought she was having some sciatica as she was having pain from her low back down into her hip. She was unable to go up/ down steps reciprocally today. Past Medical History/Comorbidities:   Pt has asthma. Social History/Living Environment:     lives alone. No stairs  Prior Level of Function/Work/Activity:  Active in exercise classes and walks 2 miles several times a day. Usually around 6 miles a day. She is retired.     Current Medications:  Zocor, singulair, albuterol      Date Last Reviewed:  4/25/2017   Number of Personal Factors/Comorbidities that affect the Plan of Care: 1-2: MODERATE COMPLEXITY   EXAMINATION:   ROM:          Knee ROM WNL    Strength:          Hip abd R- 3/5  L 4/5        Hip flex  R 4/5  L 4/5        Hip ext  R 3-/5  L 4-/5        Knee ext  R 5/5  L 5/5        Knee flex  R 4/5  L 4+/5        Ant tib-  R 4/5  L 5-/5         Neurological Screen:  R HS and R ant tib strength improved with repeated trunk ext in standing. Myotomes:  See above. Dermatomes:  No numbness or tingling. Intact to lt touch. Body Structures Involved:  1. Bones  2. Joints  3. Muscles Body Functions Affected:  1. Sensory/Pain  2. Neuromusculoskeletal Activities and Participation Affected:  1. Mobility   Number of elements (examined above) that affect the Plan of Care: 4+: HIGH COMPLEXITY   CLINICAL PRESENTATION:   Presentation: Evolving clinical presentation with changing clinical characteristics: MODERATE COMPLEXITY   CLINICAL DECISION MAKING:   Outcome Measure: Tool Used: Lower Extremity Functional Scale (LEFS)  Score:  Initial: 52/80 Most Recent: X/80 (Date: -- )   Interpretation of Score: 20 questions each scored on a 5 point scale with 0 representing \"extreme difficulty or unable to perform\" and 4 representing \"no difficulty\". The lower the score, the greater the functional disability. 80/80 represents no disability. Minimal detectable change is 9 points. Score 80 79-63 62-48 47-32 31-16 15-1 0   Modifier CH CI CJ CK CL CM CN     ? Mobility - Walking and Moving Around:     - CURRENT STATUS: CJ - 20%-39% impaired, limited or restricted    - GOAL STATUS: CI - 1%-19% impaired, limited or restricted    - D/C STATUS:  ---------------To be determined---------------    Medical Necessity:   · Patient is expected to demonstrate progress in strength, coordination and functional technique to return to previous level of function. .  Reason for Services/Other Comments:  · Patient continues to require skilled intervention due to inabilty to participate in walking program as done previously and go up /down stairs rediprocally. .   Use of outcome tool(s) and clinical judgement create a POC that gives a: Questionable prediction of patient's progress: MODERATE COMPLEXITY            TREATMENT:   (In addition to Assessment/Re-Assessment sessions the following treatments were rendered)  Pre-treatment Symptoms/Complaints:  Pt states she is doing well. She does still complain of difficulty with stairs. Pain: Initial:     0/10 Post Session:   No pain after session     Therapeutic Exercise: ( )45 min:  Exercises per grid below to improve mobility and strength. Required minimal visual, verbal and tactile cues to promote proper body alignment, promote proper body posture and promote proper body mechanics. Progressed repetitions as indicated. Date:  3/21/17 Date:  3/23/17 Date:  3/28 Date  3/30 Date  4/7/17 Date    4/25/17   Activity/Exercise Parameters Parameters Parameters      SLR 3 directions  2 x 15 SLR 1#  2 x 10  Hip abd requires assist  2 x10  Hip ext 2 x 10 SLR 1# 2 x 15  Hip abd 0# 2 x 10  Hip ext 2 x 10 SLR 2# 2 x 15  Abd 0# 2 x 10 with assist and then 1 x 10 against wall  Hip ext 2# 2 x10 SLR 2# 2 x 15  Abs 0# 2 x 10  Hip ext 2# 2 x15 SLR 2# 2 x 15  Abs 0 2 x 15  Hip 2# 2 x 15  Knee flex 2# 2 x 15   Glut lifts 1 x 15 1 x 15 2 x 15 2# 2 x15     Bicycle 5 min level 3 5 min level 3 5 min level 3 5 min level 3 5 min level 3 5 min level 3   HS Nautilus 25# x 15  30# x 15 Nautilus 30# x 15 Nautilus 40# 2 x 15 Nautilus 40#  2 x 15 Nautilus 45# 2 x 15 Nautilus 40# 2 x 15   bridging 2 x 15        Hip abd in supine 15 With band 15x Blue bland 2 x 15  Clamshell blue band 2 x 15    Shuttle  50# 2 x 10 50# 2 x 10 50# 2 x 10 62# 2 x 15 62# 2 x 15   Step ups  4' x 10 4' x10  4' stepovers x 10 6' 2 x 10 6' 2 x 10 6' 2  x15   Single leg stance  Foam  3 x 30sec         Also practiced kneeling on foam as pt has not been kneeling in [employer]. She was able to do well with kneeling. Treatment/Session Assessment:    · Response to Treatment:  Pt did well with all of her exercises. She still has weakness with eccentric control. · Compliance with Program/Exercises: compliant with ex at home. · Recommendations/Intent for next treatment session: \"Next visit will focus on stregnthening, functional activities. \".   Total Treatment Duration:  45 min  PT Patient Time In/Time Out  Time In: 0800  Time Out: 0845    Graciela Reid, PT

## 2017-05-09 ENCOUNTER — HOSPITAL ENCOUNTER (OUTPATIENT)
Dept: PHYSICAL THERAPY | Age: 72
Discharge: HOME | End: 2017-05-09
Payer: MEDICARE

## 2017-05-09 PROCEDURE — 97110 THERAPEUTIC EXERCISES: CPT

## 2017-05-09 NOTE — PROGRESS NOTES
Thelma Subramanian  : 1945 [facility] CHIRIN[facility]  [address] [facility], [address] [place] [address],[address], [place] [address].  [phone]   [phone]         OUTPATIENT PHYSICAL THERAPY:Daily Note 2017    ICD-10: Treatment Diagnosis: pain in right knee (M25.561)  Precautions/Allergies: no known allergies  Fall Risk Score: 5( (? 5 = High Risk)  MD Orders: evaluate and treat MEDICAL/REFERRING DIAGNOSIS:  knee Tear of medial meniscus, current injury , right knee,  Pain in right knee  DATE OF ONSET: 2017  REFERRING PHYSICIAN: Odilon Koroma MD  RETURN PHYSICIAN APPOINTMENT:      INITIAL ASSESSMENT:  Ms. Kal Gaytan presents with pain in the R knee after a fall in January. She was diagnosed with a meniscus tear and a stress fracture per her report. She continues to have limitations in her function. She has not been able to return to her walking program or go up and down stairs reciprocally. She would benefit from PT for problems listed below and guided rehab from this injury. PROBLEM LIST (Impacting functional limitations):  1. Decreased Strength  2. Decreased Ambulation Ability/Technique  3. Increased Pain  4. Decreased Activity Tolerance INTERVENTIONS PLANNED:  1. Balance Exercise  2. Gait Training  3. Home Exercise Program (HEP)  4. Therapeutic Exercise/Strengthening   TREATMENT PLAN:  Effective Dates: 3/16/17 TO 17. Frequency/Duration: 2 times a week for 3 weeks ( Initial order is for 3 weeks but goals written for longer.)  GOALS: (Goals have been discussed and agreed upon with patient.)  Short-Term Functional Goals: Time Frame:  2 weeks  1. Pt independent with HEP to address deficits. MET  17  2. Pt to report a decrease in her symptoms. MET 17  Discharge Goals: Time Frame: 8 weeks  1. Pt will ascend and descend stairs reciprocally with one hand rail. 2. Pt to ambulate greater than 1 mile with no increase in her symptoms.   3. R hip abd, R hip ext and R hip flex WNL to support gait activities. 4. Pt independent with high level HEP to maintain goals made in PT.  5. Pt improve LEFS score by 10 or more points indicating improved function. Rehabilitation Potential For Stated Goals: Good  Regarding Nathaly Aranda therapy, I certify that the treatment plan above will be carried out by a therapist or under their direction. Thank you for this referral,  Usman Webb, PT     Referring Physician Signature: José Miguel Rankin MD              Date                    The information in this section was collected on 3/16/17 (except where otherwise noted). HISTORY:   History of Present Injury/Illness (Reason for Referral):  Pt fell in January and injured her knee. She was recovering and returning to function but in Feb began having a pain in her posterior knee and was diagnosed meniscus tear and a stress fracture. She had an injection recently and has had good improvement in knee pain. Since that time she complains of R hip pain and lateral thigh pain. She also mentioned that she thought she was having some sciatica as she was having pain from her low back down into her hip. She was unable to go up/ down steps reciprocally today. Past Medical History/Comorbidities:   Pt has asthma. Social History/Living Environment:     lives alone. No stairs  Prior Level of Function/Work/Activity:  Active in exercise classes and walks 2 miles several times a day. Usually around 6 miles a day. She is retired.     Current Medications:  Zocor, singulair, albuterol      Date Last Reviewed:  5/9/2017   Number of Personal Factors/Comorbidities that affect the Plan of Care: 1-2: MODERATE COMPLEXITY   EXAMINATION:   ROM:          Knee ROM WNL    Strength:          Hip abd R- 3/5  L 4/5        Hip flex  R 4/5  L 4/5        Hip ext  R 3-/5  L 4-/5        Knee ext  R 5/5  L 5/5        Knee flex  R 4/5  L 4+/5        Ant tib-  R 4/5  L 5-/5         Neurological Screen:  R HS and R ant tib strength improved with repeated trunk ext in standing. Myotomes:  See above. Dermatomes:  No numbness or tingling. Intact to lt touch. Body Structures Involved:  1. Bones  2. Joints  3. Muscles Body Functions Affected:  1. Sensory/Pain  2. Neuromusculoskeletal Activities and Participation Affected:  1. Mobility   Number of elements (examined above) that affect the Plan of Care: 4+: HIGH COMPLEXITY   CLINICAL PRESENTATION:   Presentation: Evolving clinical presentation with changing clinical characteristics: MODERATE COMPLEXITY   CLINICAL DECISION MAKING:   Outcome Measure: Tool Used: Lower Extremity Functional Scale (LEFS)  Score:  Initial: 52/80 Most Recent: X/80 (Date: -- )   Interpretation of Score: 20 questions each scored on a 5 point scale with 0 representing \"extreme difficulty or unable to perform\" and 4 representing \"no difficulty\". The lower the score, the greater the functional disability. 80/80 represents no disability. Minimal detectable change is 9 points. Score 80 79-63 62-48 47-32 31-16 15-1 0   Modifier CH CI CJ CK CL CM CN     ? Mobility - Walking and Moving Around:     - CURRENT STATUS: CJ - 20%-39% impaired, limited or restricted    - GOAL STATUS: CI - 1%-19% impaired, limited or restricted    - D/C STATUS:  ---------------To be determined---------------    Medical Necessity:   · Patient is expected to demonstrate progress in strength, coordination and functional technique to return to previous level of function. .  Reason for Services/Other Comments:  · Patient continues to require skilled intervention due to inabilty to participate in walking program as done previously and go up /down stairs rediprocally. .   Use of outcome tool(s) and clinical judgement create a POC that gives a: Questionable prediction of patient's progress: MODERATE COMPLEXITY            TREATMENT:   (In addition to Assessment/Re-Assessment sessions the following treatments were rendered)  Pre-treatment Symptoms/Complaints:  Pt states she still feels it but it is not really a pain. She was away and wore different shoes and had to do more stairs. Pain: Initial:     0/10 Post Session:   No pain after session     Therapeutic Exercise: ( )45 min:  Exercises per grid below to improve mobility and strength. Required minimal visual, verbal and tactile cues to promote proper body alignment, promote proper body posture and promote proper body mechanics. Progressed repetitions as indicated. Date:  3/21/17 Date:  3/23/17 Date:  3/28 Date  3/30 Date  4/7/17 Date    4/25/17 Date  5/9/17   Activity/Exercise Parameters Parameters Parameters       SLR 3 directions  2 x 15 SLR 1#  2 x 10  Hip abd requires assist  2 x10  Hip ext 2 x 10 SLR 1# 2 x 15  Hip abd 0# 2 x 10  Hip ext 2 x 10 SLR 2# 2 x 15  Abd 0# 2 x 10 with assist and then 1 x 10 against wall  Hip ext 2# 2 x10 SLR 2# 2 x 15  Abs 0# 2 x 10  Hip ext 2# 2 x15 SLR 2# 2 x 15  Abs 0 2 x 15  Hip 2# 2 x 15  Knee flex 2# 2 x 15 Slr 2#  2 x 15  Abd against wall not weight 3 x 7   Glut lifts 1 x 15 1 x 15 2 x 15 2# 2 x15      Bicycle 5 min level 3 5 min level 3 5 min level 3 5 min level 3 5 min level 3 5 min level 3 5 min level 4   HS Nautilus 25# x 15  30# x 15 Nautilus 30# x 15 Nautilus 40# 2 x 15 Nautilus 40#  2 x 15 Nautilus 45# 2 x 15 Nautilus 40# 2 x 15 Nautilus 40# 2 x 15   bridging 2 x 15         Hip abd in supine 15 With band 15x Blue bland 2 x 15  Clamshell blue band 2 x 15     Shuttle  50# 2 x 10 50# 2 x 10 50# 2 x 10 62# 2 x 15 62# 2 x 15 62# 2 x 15   Step ups  4' x 10 4' x10  4' stepovers x 10 6' 2 x 10 6' 2 x 10 6' 2  x15 Reverse 4' 2 x 10  Lateral stepdowns 2 x 10   Single leg stance  Foam  3 x 30sec            Treatment/Session Assessment:    · Response to Treatment:  Pt did well with all of her exercises. She continues to have some weakness and she states she is afraid to kneel and still avoids steps.   She missed last week of therapy due to being out of town.  · Compliance with Program/Exercises: compliant with ex at home. · Recommendations/Intent for next treatment session: \"Next visit will focus on stregnthening, functional activities. \".   Total Treatment Duration:  45 min  PT Patient Time In/Time Out  Time In: 0800  Time Out: 8726    Kathe Simmonds, PT

## 2017-05-11 ENCOUNTER — HOSPITAL ENCOUNTER (OUTPATIENT)
Dept: PHYSICAL THERAPY | Age: 72
Discharge: HOME | End: 2017-05-11
Payer: MEDICARE

## 2017-05-11 PROCEDURE — 97110 THERAPEUTIC EXERCISES: CPT

## 2017-05-11 NOTE — PROGRESS NOTES
Ron Dos Santosotilia  : 1945 [facility] at [facility] C[facility]  [address], [address] [place] [address],[address], [facility] [address].  [phone]   [phone]         OUTPATIENT PHYSICAL THERAPY:Daily Note 2017    ICD-10: Treatment Diagnosis: pain in right knee (M25.561)  Precautions/Allergies: no known allergies  Fall Risk Score: 5( (? 5 = High Risk)  MD Orders: evaluate and treat MEDICAL/REFERRING DIAGNOSIS:  knee Tear of medial meniscus, current injury , right knee,  Pain in right knee  DATE OF ONSET: 2017  REFERRING PHYSICIAN: Kristin Chowdary MD  RETURN PHYSICIAN APPOINTMENT:      INITIAL ASSESSMENT:  Ms. Christa Villeda presents with pain in the R knee after a fall in January. She was diagnosed with a meniscus tear and a stress fracture per her report. She continues to have limitations in her function. She has not been able to return to her walking program or go up and down stairs reciprocally. She would benefit from PT for problems listed below and guided rehab from this injury. PROBLEM LIST (Impacting functional limitations):  1. Decreased Strength  2. Decreased Ambulation Ability/Technique  3. Increased Pain  4. Decreased Activity Tolerance INTERVENTIONS PLANNED:  1. Balance Exercise  2. Gait Training  3. Home Exercise Program (HEP)  4. Therapeutic Exercise/Strengthening   TREATMENT PLAN:  Effective Dates: 3/16/17 TO 17. Frequency/Duration: 2 times a week for 3 weeks ( Initial order is for 3 weeks but goals written for longer.)  GOALS: (Goals have been discussed and agreed upon with patient.)  Short-Term Functional Goals: Time Frame:  2 weeks  1. Pt independent with HEP to address deficits. MET  17  2. Pt to report a decrease in her symptoms. MET 17  Discharge Goals: Time Frame: 8 weeks  1. Pt will ascend and descend stairs reciprocally with one hand rail. 2. Pt to ambulate greater than 1 mile with no increase in her symptoms.   3. R hip abd, R hip ext and R hip flex WNL to support gait activities. 4. Pt independent with high level HEP to maintain goals made in PT.  5. Pt improve LEFS score by 10 or more points indicating improved function. Rehabilitation Potential For Stated Goals: Good  Regarding Dank Carson therapy, I certify that the treatment plan above will be carried out by a therapist or under their direction. Thank you for this referral,  Ayad Mancia PT     Referring Physician Signature: Sierra Sheldon MD              Date                    The information in this section was collected on 3/16/17 (except where otherwise noted). HISTORY:   History of Present Injury/Illness (Reason for Referral):  Pt fell in January and injured her knee. She was recovering and returning to function but in Feb began having a pain in her posterior knee and was diagnosed meniscus tear and a stress fracture. She had an injection recently and has had good improvement in knee pain. Since that time she complains of R hip pain and lateral thigh pain. She also mentioned that she thought she was having some sciatica as she was having pain from her low back down into her hip. She was unable to go up/ down steps reciprocally today. Past Medical History/Comorbidities:   Pt has asthma. Social History/Living Environment:     lives alone. No stairs  Prior Level of Function/Work/Activity:  Active in exercise classes and walks 2 miles several times a day. Usually around 6 miles a day. She is retired.     Current Medications:  Zocor, singulair, albuterol      Date Last Reviewed:  5/11/2017   Number of Personal Factors/Comorbidities that affect the Plan of Care: 1-2: MODERATE COMPLEXITY   EXAMINATION:   ROM:          Knee ROM WNL    Strength:          Hip abd R- 3/5  L 4/5        Hip flex  R 4/5  L 4/5        Hip ext  R 3-/5  L 4-/5        Knee ext  R 5/5  L 5/5        Knee flex  R 4/5  L 4+/5        Ant tib-  R 4/5  L 5-/5         Neurological Screen:  R HS and R ant tib strength improved with repeated trunk ext in standing. Myotomes:  See above. Dermatomes:  No numbness or tingling. Intact to lt touch. Body Structures Involved:  1. Bones  2. Joints  3. Muscles Body Functions Affected:  1. Sensory/Pain  2. Neuromusculoskeletal Activities and Participation Affected:  1. Mobility   Number of elements (examined above) that affect the Plan of Care: 4+: HIGH COMPLEXITY   CLINICAL PRESENTATION:   Presentation: Evolving clinical presentation with changing clinical characteristics: MODERATE COMPLEXITY   CLINICAL DECISION MAKING:   Outcome Measure: Tool Used: Lower Extremity Functional Scale (LEFS)  Score:  Initial: 52/80 Most Recent: X/80 (Date: -- )   Interpretation of Score: 20 questions each scored on a 5 point scale with 0 representing \"extreme difficulty or unable to perform\" and 4 representing \"no difficulty\". The lower the score, the greater the functional disability. 80/80 represents no disability. Minimal detectable change is 9 points. Score 80 79-63 62-48 47-32 31-16 15-1 0   Modifier CH CI CJ CK CL CM CN     ? Mobility - Walking and Moving Around:     - CURRENT STATUS: CJ - 20%-39% impaired, limited or restricted    - GOAL STATUS: CI - 1%-19% impaired, limited or restricted    - D/C STATUS:  ---------------To be determined---------------    Medical Necessity:   · Patient is expected to demonstrate progress in strength, coordination and functional technique to return to previous level of function. .  Reason for Services/Other Comments:  · Patient continues to require skilled intervention due to inabilty to participate in walking program as done previously and go up /down stairs rediprocally. .   Use of outcome tool(s) and clinical judgement create a POC that gives a: Questionable prediction of patient's progress: MODERATE COMPLEXITY            TREATMENT:   (In addition to Assessment/Re-Assessment sessions the following treatments were rendered)  Pre-treatment Symptoms/Complaints:  Pt is pleased that her knee quieted down quickly after being flared up. Pain: Initial:     0/10 Post Session:   No pain after session     Therapeutic Exercise: ( )45 min:  Exercises per grid below to improve mobility and strength. Required minimal visual, verbal and tactile cues to promote proper body alignment, promote proper body posture and promote proper body mechanics. Progressed repetitions as indicated. Date  5/9/17 Date  5/11/17   Activity/Exercise     SLR 3 directions Slr 2#  2 x 15  Abd against wall not weight 3 x 7 SLR 2# 2 x 15  abd against wall 2 x 10   Glut lifts     Bicycle 5 min level 4 5 min level 4   HS Nautilus 40# 2 x 15 Nautilus 40# 2 x 15   bridging     Hip abd in supine     Shuttle 62# 2 x 15 62# 2 x 15   Step ups Reverse 4' 2 x 10  Lateral stepdowns 2 x 10 Reverse 4' 2 x 10  Lateral step downs 2 x 10   Lateral stepping  Blue band 4 x 10 feet   Lunge onto Bosu  2 x 10            Treatment/Session Assessment:    · Response to Treatment:  Instability noted with lunge. Continues to do well with strengthening. No complaint of knee symptoms. · Compliance with Program/Exercises: compliant with ex at home. · Recommendations/Intent for next treatment session: \"Next visit will focus on stregnthening, functional activities. \".   Total Treatment Duration:  45 min       Shireen Murdock PT

## 2017-05-16 ENCOUNTER — HOSPITAL ENCOUNTER (OUTPATIENT)
Dept: PHYSICAL THERAPY | Age: 72
Discharge: HOME | End: 2017-05-16
Payer: MEDICARE

## 2017-05-16 PROCEDURE — 97110 THERAPEUTIC EXERCISES: CPT

## 2017-05-16 PROCEDURE — G8978 MOBILITY CURRENT STATUS: HCPCS

## 2017-05-16 PROCEDURE — G8979 MOBILITY GOAL STATUS: HCPCS

## 2017-05-16 NOTE — PROGRESS NOTES
Rodrigo Escisreal  : 1945 [facility]  [address] [facility], [address], [facility] [address].  [phone]   [phone]         OUTPATIENT PHYSICAL THERAPY:Daily Note and Progress Report 2017    ICD-10: Treatment Diagnosis: pain in right knee (M25.561)  Precautions/Allergies: no known allergies  Fall Risk Score: 5( (? 5 = High Risk)  MD Orders: evaluate and treat MEDICAL/REFERRING DIAGNOSIS:  knee Tear of medial meniscus, current injury , right knee,  Pain in right knee  DATE OF ONSET: 2017  REFERRING PHYSICIAN: Sofia Gordon MD  RETURN PHYSICIAN APPOINTMENT:      INITIAL ASSESSMENT:  Ms. Rohan Yeung presents with pain in the R knee after a fall in January. Pt has been seen for 10 visits of PT and has reached goals as below. Plan is to discharge her to a HEP in a few more visits. PROBLEM LIST (Impacting functional limitations):  1. Decreased Strength  2. Decreased Ambulation Ability/Technique  3. Increased Pain  4. Decreased Activity Tolerance INTERVENTIONS PLANNED:  1. Balance Exercise  2. Gait Training  3. Home Exercise Program (HEP)  4. Therapeutic Exercise/Strengthening   TREATMENT PLAN:  Effective Dates: 3/16/17 TO 17. Frequency/Duration: 2 times a week for 3 weeks ( Initial order is for 3 weeks but goals written for longer.)  GOALS: (Goals have been discussed and agreed upon with patient.)  Short-Term Functional Goals: Time Frame:  2 weeks  1. Pt independent with HEP to address deficits. MET  4.  2. Pt to report a decrease in her symptoms. MET 17  Discharge Goals: Time Frame: 8 weeks  1. Pt will ascend and descend stairs reciprocally with one hand rail. MET 17   2. Pt to ambulate greater than 1 mile with no increase in her symptoms. Not tested  3. R hip abd, R hip ext and R hip flex WNL to support gait activities. ongoing  4. Pt independent with high level HEP to maintain goals made in PT.  ongoing  5.  Pt improve LEFS score by 10 or more points indicating improved function. MET 5/16/17  Rehabilitation Potential For Stated Goals: Good  Regarding Dank Carson therapy, I certify that the treatment plan above will be carried out by a therapist or under their direction. Thank you for this referral,  Ayad Mancia PT     Referring Physician Signature: Sierra Sheldon MD              Date                    The information in this section was collected on 3/16/17 (except where otherwise noted). HISTORY:   History of Present Injury/Illness (Reason for Referral):  Pt fell in January and injured her knee. She was recovering and returning to function but in Feb began having a pain in her posterior knee and was diagnosed meniscus tear and a stress fracture. She had an injection recently and has had good improvement in knee pain. Since that time she complains of R hip pain and lateral thigh pain. She also mentioned that she thought she was having some sciatica as she was having pain from her low back down into her hip. She was unable to go up/ down steps reciprocally today. Past Medical History/Comorbidities:   Pt has asthma. Social History/Living Environment:     lives alone. No stairs  Prior Level of Function/Work/Activity:  Active in exercise classes and walks 2 miles several times a day. Usually around 6 miles a day. She is retired. Current Medications:  Zocor, singulair, albuterol      Date Last Reviewed:  5/16/2017   Number of Personal Factors/Comorbidities that affect the Plan of Care: 1-2: MODERATE COMPLEXITY   EXAMINATION:   ROM:          Knee ROM WNL    Strength:          Hip abd R- 3/5  L 4/5        Hip flex  R 4/5  L 4/5        Hip ext  R 3-/5  L 4-/5        Knee ext  R 5/5  L 5/5        Knee flex  R 4/5  L 4+/5        Ant tib-  R 4/5  L 5-/5         Neurological Screen:            Myotomes:  See above. Dermatomes:  No numbness or tingling. Intact to lt touch.    Body Structures Involved:  1. Bones  2. Joints  3. Muscles Body Functions Affected:  1. Sensory/Pain  2. Neuromusculoskeletal Activities and Participation Affected:  1. Mobility   Number of elements (examined above) that affect the Plan of Care: 4+: HIGH COMPLEXITY   CLINICAL PRESENTATION:   Presentation: Evolving clinical presentation with changing clinical characteristics: MODERATE COMPLEXITY   CLINICAL DECISION MAKING:   Outcome Measure: Tool Used: Lower Extremity Functional Scale (LEFS)  Score:  Initial: 52/80 Most Recent: 74/80 (Date: -- )   Interpretation of Score: 20 questions each scored on a 5 point scale with 0 representing \"extreme difficulty or unable to perform\" and 4 representing \"no difficulty\". The lower the score, the greater the functional disability. 80/80 represents no disability. Minimal detectable change is 9 points. Score 80 79-63 62-48 47-32 31-16 15-1 0   Modifier CH CI CJ CK CL CM CN     ? Mobility - Walking and Moving Around:     - CURRENT STATUS: CI - 1%-19% impaired, limited or restricted    - GOAL STATUS: CI - 1%-19% impaired, limited or restricted    - D/C STATUS:  ---------------To be determined---------------    Medical Necessity:   · Patient is expected to demonstrate progress in strength, coordination and functional technique to return to previous level of function. .  Reason for Services/Other Comments:  · Patient continues to require skilled intervention due to inabilty to participate in walking program as done previously and go up /down stairs rediprocally. .   Use of outcome tool(s) and clinical judgement create a POC that gives a: Questionable prediction of patient's progress: MODERATE COMPLEXITY            TREATMENT:   (In addition to Assessment/Re-Assessment sessions the following treatments were rendered)  Pre-treatment Symptoms/Complaints:  Pt continues to report an awareness in her knee but she would not call it pain.   Pain: Initial:     0/10 Post Session:   No pain after session     Therapeutic Exercise: ( )45 min:  Exercises per grid below to improve mobility and strength. Required minimal visual, verbal and tactile cues to promote proper body alignment, promote proper body posture and promote proper body mechanics. Progressed repetitions as indicated. Date  5/9/17 Date  5/11/17 Date  5/16/17   Activity/Exercise      SLR 3 directions Slr 2#  2 x 15  Abd against wall not weight 3 x 7 SLR 2# 2 x 15  abd against wall 2 x 10 SLR 2# 2 x 15   Glut lifts   2 x 15 2#   Bicycle 5 min level 4 5 min level 4 5 min level 4   HS Nautilus 40# 2 x 15 Nautilus 40# 2 x 15 Nautilus 40# 2 x 15   bridging      Hip abd in supine      Shuttle 62# 2 x 15 62# 2 x 15 62# 2 x 15   Step ups Reverse 4' 2 x 10  Lateral stepdowns 2 x 10 Reverse 4' 2 x 10  Lateral step downs 2 x 10 Reverse 4' 2 x 10  Lateral step downs 2 x 10   Lateral stepping  Blue band 4 x 10 feet Black band 4 x 12 feet   Lunge onto Bosu  2 x 10 2 x 10             Treatment/Session Assessment:    · Response to Treatment:  Pt continues to do well with strengthening. LEFS score has improved from 52 to 74 indicating big improvement in function. · Compliance with Program/Exercises: compliant with ex at home. · Recommendations/Intent for next treatment session: \"Next visit will focus on stregnthening, functional activities. \".   Total Treatment Duration:  45 min       Valdemar Lynn, PT

## 2017-05-18 ENCOUNTER — HOSPITAL ENCOUNTER (OUTPATIENT)
Dept: PHYSICAL THERAPY | Age: 72
Discharge: HOME | End: 2017-05-18
Payer: MEDICARE

## 2017-05-18 PROCEDURE — 97110 THERAPEUTIC EXERCISES: CPT

## 2017-05-18 NOTE — PROGRESS NOTES
Aftab Connelly  : 1945 [facility]  [address] [facility], [address] [place] [address],[address], [address] [facility]  [phone]   [phone]         OUTPATIENT PHYSICAL THERAPY:Daily Note 2017    ICD-10: Treatment Diagnosis: pain in right knee (M25.561)  Precautions/Allergies: no known allergies  Fall Risk Score: 5( (? 5 = High Risk)  MD Orders: evaluate and treat MEDICAL/REFERRING DIAGNOSIS:  knee Tear of medial meniscus, current injury , right knee,  Pain in right knee  DATE OF ONSET: 2017  REFERRING PHYSICIAN: Felecia Sibley MD  RETURN PHYSICIAN APPOINTMENT:      INITIAL ASSESSMENT:  Ms. Cyrus Negrete presents with pain in the R knee after a fall in January. Pt has been seen for 10 visits of PT and has reached goals as below. Plan is to discharge her to a HEP in a few more visits. PROBLEM LIST (Impacting functional limitations):  1. Decreased Strength  2. Decreased Ambulation Ability/Technique  3. Increased Pain  4. Decreased Activity Tolerance INTERVENTIONS PLANNED:  1. Balance Exercise  2. Gait Training  3. Home Exercise Program (HEP)  4. Therapeutic Exercise/Strengthening   TREATMENT PLAN:  Effective Dates: 3/16/17 TO 17. Frequency/Duration: 2 times a week for 3 weeks ( Initial order is for 3 weeks but goals written for longer.)  GOALS: (Goals have been discussed and agreed upon with patient.)  Short-Term Functional Goals: Time Frame:  2 weeks  1. Pt independent with HEP to address deficits. MET  4.  2. Pt to report a decrease in her symptoms. MET 17  Discharge Goals: Time Frame: 8 weeks  1. Pt will ascend and descend stairs reciprocally with one hand rail. MET 17   2. Pt to ambulate greater than 1 mile with no increase in her symptoms. Not tested  3. R hip abd, R hip ext and R hip flex WNL to support gait activities. ongoing  4. Pt independent with high level HEP to maintain goals made in PT.  ongoing  5.  Pt improve LEFS score by 10 or more points indicating improved function. MET 5/16/17  Rehabilitation Potential For Stated Goals: Good  Regarding [address] therapy, I certify that the treatment plan above will be carried out by a therapist or under their direction. Thank you for this referral,  Cheyanne Jensen PT     Referring Physician Signature: Peyton Humphrey MD              Date                    The information in this section was collected on 3/16/17 (except where otherwise noted). HISTORY:   History of Present Injury/Illness (Reason for Referral):  Pt fell in January and injured her knee. She was recovering and returning to function but in Feb began having a pain in her posterior knee and was diagnosed meniscus tear and a stress fracture. She had an injection recently and has had good improvement in knee pain. Since that time she complains of R hip pain and lateral thigh pain. She also mentioned that she thought she was having some sciatica as she was having pain from her low back down into her hip. She was unable to go up/ down steps reciprocally today. Past Medical History/Comorbidities:   Pt has asthma. Social History/Living Environment:     lives alone. No stairs  Prior Level of Function/Work/Activity:  Active in exercise classes and walks 2 miles several times a day. Usually around 6 miles a day. She is retired. Current Medications:  Zocor, singulair, albuterol      Date Last Reviewed:  5/18/2017   Number of Personal Factors/Comorbidities that affect the Plan of Care: 1-2: MODERATE COMPLEXITY   EXAMINATION:   ROM:          Knee ROM WNL    Strength:          Hip abd R- 3/5  L 4/5        Hip flex  R 4/5  L 4/5        Hip ext  R 3-/5  L 4-/5        Knee ext  R 5/5  L 5/5        Knee flex  R 4/5  L 4+/5        Ant tib-  R 4/5  L 5-/5         Neurological Screen:            Myotomes:  See above. Dermatomes:  No numbness or tingling. Intact to lt touch. Body Structures Involved:  1. Bones  2. Joints  3.  Muscles Body Functions Affected:  1. Sensory/Pain  2. Neuromusculoskeletal Activities and Participation Affected:  1. Mobility   Number of elements (examined above) that affect the Plan of Care: 4+: HIGH COMPLEXITY   CLINICAL PRESENTATION:   Presentation: Evolving clinical presentation with changing clinical characteristics: MODERATE COMPLEXITY   CLINICAL DECISION MAKING:   Outcome Measure: Tool Used: Lower Extremity Functional Scale (LEFS)  Score:  Initial: 52/80 Most Recent: 74/80 (Date: -- )   Interpretation of Score: 20 questions each scored on a 5 point scale with 0 representing \"extreme difficulty or unable to perform\" and 4 representing \"no difficulty\". The lower the score, the greater the functional disability. 80/80 represents no disability. Minimal detectable change is 9 points. Score 80 79-63 62-48 47-32 31-16 15-1 0   Modifier CH CI CJ CK CL CM CN     ? Mobility - Walking and Moving Around:     - CURRENT STATUS: CI - 1%-19% impaired, limited or restricted    - GOAL STATUS: CI - 1%-19% impaired, limited or restricted    - D/C STATUS:  ---------------To be determined---------------    Medical Necessity:   · Patient is expected to demonstrate progress in strength, coordination and functional technique to return to previous level of function. .  Reason for Services/Other Comments:  · Patient continues to require skilled intervention due to inabilty to participate in walking program as done previously and go up /down stairs rediprocally. .   Use of outcome tool(s) and clinical judgement create a POC that gives a: Questionable prediction of patient's progress: MODERATE COMPLEXITY            TREATMENT:   (In addition to Assessment/Re-Assessment sessions the following treatments were rendered)  Pre-treatment Symptoms/Complaints:  She reports not real pain.   Pain: Initial:     0/10 Post Session:   No pain after session     Therapeutic Exercise: ( )45 min:  Exercises per grid below to improve mobility and strength. Required minimal visual, verbal and tactile cues to promote proper body alignment, promote proper body posture and promote proper body mechanics. Progressed repetitions as indicated. Date  5/9/17 Date  5/11/17 Date  5/18/17   Activity/Exercise      SLR 3 directions Slr 2#  2 x 15  Abd against wall not weight 3 x 7 SLR 2# 2 x 15  abd against wall 2 x 10 SLR 2# 2 x 15   Glut lifts   2 x 15 2#   Bicycle 5 min level 4 5 min level 4 5 min level 4   HS Nautilus 40# 2 x 15 Nautilus 40# 2 x 15 Nautilus 40# 2 x 15   bridging      Hip abd in supine      Shuttle 62# 2 x 15 62# 2 x 15 62# 2 x 15   Step ups Reverse 4' 2 x 10  Lateral stepdowns 2 x 10 Reverse 4' 2 x 10  Lateral step downs 2 x 10 Reverse 4' 2 x 10  Lateral step downs 2 x 10   Lateral stepping  Blue band 4 x 10 feet Black band 4 x 12 feet   Lunge onto Bosu  2 x 10 2 x 10             Treatment/Session Assessment:    · Response to Treatment:  Pt is pleased with her progress. · Compliance with Program/Exercises: compliant with ex at home. · Recommendations/Intent for next treatment session: \"Next visit will focus on stregnthening, functional activities. \".   Total Treatment Duration:  45 min  PT Patient Time In/Time Out  Time In: 0930  Time Out: 1015    Ayad Mancia PT

## 2017-05-23 ENCOUNTER — HOSPITAL ENCOUNTER (OUTPATIENT)
Dept: PHYSICAL THERAPY | Age: 72
Discharge: HOME | End: 2017-05-23
Payer: MEDICARE

## 2017-05-25 ENCOUNTER — APPOINTMENT (OUTPATIENT)
Dept: PHYSICAL THERAPY | Age: 72
End: 2017-05-25
Payer: MEDICARE

## 2017-05-31 NOTE — PROGRESS NOTES
Isaac Laguna  : 1945 [facility]IR[facility]  [address] [facility], [address] [place] [address],[address], [address] [facility]  [phone]   [phone]         OUTPATIENT PHYSICAL THERAPY:Discontinuation Summary 2017    ICD-10: Treatment Diagnosis: pain in right knee (M25.561)  Precautions/Allergies: no known allergies  Fall Risk Score: 5( (? 5 = High Risk)  MD Orders: evaluate and treat MEDICAL/REFERRING DIAGNOSIS:  knee Tear of medial meniscus, current injury , right knee,  Pain in right knee  DATE OF ONSET: 2017  REFERRING PHYSICIAN: Abiodun Pompa MD  RETURN PHYSICIAN APPOINTMENT:      INITIAL ASSESSMENT:  Ms. Eve Rojas presents with pain in the R knee after a fall in January. Pt has been seen for 10 visits of PT and has reached goals as below. Plan is to discharge her to a HEP in a few more visits. Pt cancelled last 2 visits and will be discharged at this time. PROBLEM LIST (Impacting functional limitations):  1. Decreased Strength  2. Decreased Ambulation Ability/Technique  3. Increased Pain  4. Decreased Activity Tolerance INTERVENTIONS PLANNED:  1. Balance Exercise  2. Gait Training  3. Home Exercise Program (HEP)  4. Therapeutic Exercise/Strengthening   TREATMENT PLAN:  Effective Dates: 3/16/17 TO 17. Frequency/Duration: 2 times a week for 3 weeks ( Initial order is for 3 weeks but goals written for longer.)  GOALS: (Goals have been discussed and agreed upon with patient.)  Short-Term Functional Goals: Time Frame:  2 weeks  1. Pt independent with HEP to address deficits. MET  17  2. Pt to report a decrease in her symptoms. MET 17  Discharge Goals: Time Frame: 8 weeks  1. Pt will ascend and descend stairs reciprocally with one hand rail. MET 17   2. Pt to ambulate greater than 1 mile with no increase in her symptoms. Not tested  3. R hip abd, R hip ext and R hip flex WNL to support gait activities. ongoing  4.  Pt independent with high level HEP to maintain goals made in PT.  ongoing  5. Pt improve LEFS score by 10 or more points indicating improved function. MET 5/16/17  Rehabilitation Potential For Stated Goals: Good  Regarding [place] therapy, I certify that the treatment plan above will be carried out by a therapist or under their direction. Thank you for this referral,  Anurag Tanner, PT     Referring Physician Signature: Ash Fair MD              Date                    The information in this section was collected on 3/16/17 (except where otherwise noted). HISTORY:   History of Present Injury/Illness (Reason for Referral):  Pt fell in January and injured her knee. She was recovering and returning to function but in Feb began having a pain in her posterior knee and was diagnosed meniscus tear and a stress fracture. She had an injection recently and has had good improvement in knee pain. Since that time she complains of R hip pain and lateral thigh pain. She also mentioned that she thought she was having some sciatica as she was having pain from her low back down into her hip. She was unable to go up/ down steps reciprocally today. Past Medical History/Comorbidities:   Pt has asthma. Social History/Living Environment:     lives alone. No stairs  Prior Level of Function/Work/Activity:  Active in exercise classes and walks 2 miles several times a day. Usually around 6 miles a day. She is retired. Current Medications:  Zocor, singulair, albuterol      Date Last Reviewed:  5/31/2017   Number of Personal Factors/Comorbidities that affect the Plan of Care: 1-2: MODERATE COMPLEXITY   EXAMINATION:   ROM:          Knee ROM WNL    Strength:          Hip abd R- 3/5  L 4/5        Hip flex  R 4/5  L 4/5        Hip ext  R 3-/5  L 4-/5        Knee ext  R 5/5  L 5/5        Knee flex  R 4/5  L 4+/5        Ant tib-  R 4/5  L 5-/5         Neurological Screen:            Myotomes:  See above. Dermatomes:  No numbness or tingling.   Intact to lt touch.   Body Structures Involved:  1. Bones  2. Joints  3. Muscles Body Functions Affected:  1. Sensory/Pain  2. Neuromusculoskeletal Activities and Participation Affected:  1. Mobility   Number of elements (examined above) that affect the Plan of Care: 4+: HIGH COMPLEXITY   CLINICAL PRESENTATION:   Presentation: Evolving clinical presentation with changing clinical characteristics: MODERATE COMPLEXITY   CLINICAL DECISION MAKING:   Outcome Measure: Tool Used: Lower Extremity Functional Scale (LEFS)  Score:  Initial: 52/80 Most Recent: 74/80 (Date: -- )   Interpretation of Score: 20 questions each scored on a 5 point scale with 0 representing \"extreme difficulty or unable to perform\" and 4 representing \"no difficulty\". The lower the score, the greater the functional disability. 80/80 represents no disability. Minimal detectable change is 9 points. Score 80 79-63 62-48 47-32 31-16 15-1 0   Modifier CH CI CJ CK CL CM CN     ? Mobility - Walking and Moving Around:     - CURRENT STATUS: CI - 1%-19% impaired, limited or restricted    - GOAL STATUS: CI - 1%-19% impaired, limited or restricted    - D/C STATUS:  ---------------To be determined---------------    Medical Necessity:   · Patient is expected to demonstrate progress in strength, coordination and functional technique to return to previous level of function. .  Reason for Services/Other Comments:  · Patient continues to require skilled intervention due to inabilty to participate in walking program as done previously and go up /down stairs rediprocally. .   Use of outcome tool(s) and clinical judgement create a POC that gives a: Questionable prediction of patient's progress: MODERATE COMPLEXITY

## 2017-08-31 NOTE — PROGRESS NOTES
Dorothea Clark  : 1945 [facility]  [address] [facility], [address], [place] [address].  [phone]   [phone]         OUTPATIENT PHYSICAL THERAPY:Daily Note and Discontinuation Summary 2017    ICD-10: Treatment Diagnosis: pain in right knee (M25.561)  Precautions/Allergies: no known allergies  Fall Risk Score: 5( (? 5 = High Risk)  MD Orders: evaluate and treat MEDICAL/REFERRING DIAGNOSIS:  knee Tear of medial meniscus, current injury , right knee,  Pain in right knee  DATE OF ONSET: 2017  REFERRING PHYSICIAN: Jose Anglin MD  RETURN PHYSICIAN APPOINTMENT:      INITIAL ASSESSMENT:  Ms. Julianne Del Rio presents with pain in the R knee after a fall in January. Pt has been seen for 11 visits of PT and has reached goals as below. She cancelled her last visit so was not reassessed. She will be discharged at this time. PROBLEM LIST (Impacting functional limitations):  1. Decreased Strength  2. Decreased Ambulation Ability/Technique  3. Increased Pain  4. Decreased Activity Tolerance INTERVENTIONS PLANNED:  1. Balance Exercise  2. Gait Training  3. Home Exercise Program (HEP)  4. Therapeutic Exercise/Strengthening   TREATMENT PLAN:  Effective Dates: 3/16/17 TO 17. Frequency/Duration: 2 times a week for 3 weeks ( Initial order is for 3 weeks but goals written for longer.)  GOALS: (Goals have been discussed and agreed upon with patient.)  Short-Term Functional Goals: Time Frame:  2 weeks  1. Pt independent with HEP to address deficits. MET  .  2. Pt to report a decrease in her symptoms. MET 17  Discharge Goals: Time Frame: 8 weeks  1. Pt will ascend and descend stairs reciprocally with one hand rail. MET 17   2. Pt to ambulate greater than 1 mile with no increase in her symptoms. Not tested  3. R hip abd, R hip ext and R hip flex WNL to support gait activities. ongoing  4.  Pt independent with high level HEP to maintain goals made in PT. ongoing  5. Pt improve LEFS score by 10 or more points indicating improved function. MET 5/16/17  Rehabilitation Potential For Stated Goals: Good  Regarding Vivia Inks therapy, I certify that the treatment plan above will be carried out by a therapist or under their direction.   Thank you for this referral,  Hillary Booker, PT     Referring Physician Signature: Cosme Alvarado MD              Date

## 2017-09-13 ENCOUNTER — APPOINTMENT (RX ONLY)
Dept: URBAN - METROPOLITAN AREA CLINIC 349 | Facility: CLINIC | Age: 72
Setting detail: DERMATOLOGY
End: 2017-09-13

## 2017-09-13 DIAGNOSIS — L82.0 INFLAMED SEBORRHEIC KERATOSIS: ICD-10-CM

## 2017-09-13 DIAGNOSIS — L20.89 OTHER ATOPIC DERMATITIS: ICD-10-CM | Status: WELL CONTROLLED

## 2017-09-13 DIAGNOSIS — L57.0 ACTINIC KERATOSIS: ICD-10-CM | Status: IMPROVED

## 2017-09-13 DIAGNOSIS — D22 MELANOCYTIC NEVI: ICD-10-CM | Status: STABLE

## 2017-09-13 PROBLEM — D22.71 MELANOCYTIC NEVI OF RIGHT LOWER LIMB, INCLUDING HIP: Status: ACTIVE | Noted: 2017-09-13

## 2017-09-13 PROBLEM — L20.84 INTRINSIC (ALLERGIC) ECZEMA: Status: ACTIVE | Noted: 2017-09-13

## 2017-09-13 PROBLEM — E78.5 HYPERLIPIDEMIA, UNSPECIFIED: Status: ACTIVE | Noted: 2017-09-13

## 2017-09-13 PROBLEM — D22.61 MELANOCYTIC NEVI OF RIGHT UPPER LIMB, INCLUDING SHOULDER: Status: ACTIVE | Noted: 2017-09-13

## 2017-09-13 PROBLEM — D22.62 MELANOCYTIC NEVI OF LEFT UPPER LIMB, INCLUDING SHOULDER: Status: ACTIVE | Noted: 2017-09-13

## 2017-09-13 PROBLEM — J45.909 UNSPECIFIED ASTHMA, UNCOMPLICATED: Status: ACTIVE | Noted: 2017-09-13

## 2017-09-13 PROBLEM — D22.72 MELANOCYTIC NEVI OF LEFT LOWER LIMB, INCLUDING HIP: Status: ACTIVE | Noted: 2017-09-13

## 2017-09-13 PROBLEM — D22.5 MELANOCYTIC NEVI OF TRUNK: Status: ACTIVE | Noted: 2017-09-13

## 2017-09-13 PROCEDURE — ? OBSERVATION

## 2017-09-13 PROCEDURE — ? BODY PHOTOGRAPHY

## 2017-09-13 PROCEDURE — ? MEDICAL PHOTOGRAPHY REVIEW

## 2017-09-13 PROCEDURE — ? LIQUID NITROGEN

## 2017-09-13 PROCEDURE — ? COUNSELING

## 2017-09-13 PROCEDURE — ? PRESCRIPTION

## 2017-09-13 PROCEDURE — 17000 DESTRUCT PREMALG LESION: CPT | Mod: 59

## 2017-09-13 PROCEDURE — 99214 OFFICE O/P EST MOD 30 MIN: CPT | Mod: 25

## 2017-09-13 PROCEDURE — 17110 DESTRUCTION B9 LES UP TO 14: CPT

## 2017-09-13 RX ORDER — DESOXIMETASONE 2.5 MG/G
OINTMENT TOPICAL
Qty: 1 | Refills: 2 | Status: ERX | COMMUNITY
Start: 2017-09-13

## 2017-09-13 RX ADMIN — DESOXIMETASONE: 2.5 OINTMENT TOPICAL at 12:31

## 2017-09-13 ASSESSMENT — SEVERITY ASSESSMENT: SEVERITY: ALMOST CLEAR

## 2017-09-13 ASSESSMENT — LOCATION SIMPLE DESCRIPTION DERM
LOCATION SIMPLE: RIGHT 5TH TOE
LOCATION SIMPLE: ABDOMEN
LOCATION SIMPLE: LEFT POSTERIOR THIGH
LOCATION SIMPLE: LEFT FOREARM
LOCATION SIMPLE: RIGHT LOWER BACK
LOCATION SIMPLE: RIGHT FOREARM
LOCATION SIMPLE: LEFT PRETIBIAL REGION
LOCATION SIMPLE: RIGHT POSTERIOR UPPER ARM
LOCATION SIMPLE: LEFT POSTERIOR UPPER ARM
LOCATION SIMPLE: RIGHT POSTERIOR THIGH
LOCATION SIMPLE: RIGHT PRETIBIAL REGION
LOCATION SIMPLE: RIGHT EYEBROW

## 2017-09-13 ASSESSMENT — LOCATION ZONE DERM
LOCATION ZONE: LEG
LOCATION ZONE: FACE
LOCATION ZONE: TOE
LOCATION ZONE: TRUNK
LOCATION ZONE: ARM

## 2017-09-13 ASSESSMENT — LOCATION DETAILED DESCRIPTION DERM
LOCATION DETAILED: RIGHT DISTAL DORSAL FOREARM
LOCATION DETAILED: LEFT PROXIMAL DORSAL FOREARM
LOCATION DETAILED: RIGHT PROXIMAL POSTERIOR UPPER ARM
LOCATION DETAILED: RIGHT PROXIMAL POSTERIOR THIGH
LOCATION DETAILED: LEFT DISTAL PRETIBIAL REGION
LOCATION DETAILED: RIGHT CENTRAL EYEBROW
LOCATION DETAILED: RIGHT DISTAL PRETIBIAL REGION
LOCATION DETAILED: LEFT PROXIMAL POSTERIOR UPPER ARM
LOCATION DETAILED: RIGHT SUPERIOR MEDIAL LOWER BACK
LOCATION DETAILED: LEFT PROXIMAL POSTERIOR THIGH
LOCATION DETAILED: SUBXIPHOID
LOCATION DETAILED: RIGHT MEDIAL 5TH TOE

## 2017-09-13 ASSESSMENT — PAIN INTENSITY VAS: HOW INTENSE IS YOUR PAIN 0 BEING NO PAIN, 10 BEING THE MOST SEVERE PAIN POSSIBLE?: NO PAIN

## 2017-09-13 NOTE — PROCEDURE: LIQUID NITROGEN
Post-Care Instructions: I reviewed with the patient in detail post-care instructions. Patient is to wear sunprotection, and avoid picking at any of the treated lesions. Pt may apply Vaseline to crusted or scabbing areas.
Medical Necessity Information: It is in your best interest to select a reason for this procedure from the list below. All of these items fulfill various CMS LCD requirements except the new and changing color options.
Render Post-Care Instructions In Note?: yes
Render Post-Care Instructions In Note?: no
Medical Necessity Clause: This procedure was medically necessary because the lesions that were treated were:
Consent: The patient's consent was obtained including but not limited to risks of crusting, scabbing, blistering, scarring, darker or lighter pigmentary change, recurrence, incomplete removal and infection.
Detail Level: Detailed
Number Of Freeze-Thaw Cycles: 2 freeze-thaw cycles
Duration Of Freeze Thaw-Cycle (Seconds): 0

## 2017-09-13 NOTE — PROCEDURE: BODY PHOTOGRAPHY
Reason For Photography: The patient is obtaining whole body photography to observe existing suspicious moles and or monitor for the appearance of any new lesions.
Detail Level: Detailed
Consent: Written consent obtained, risks reviewed for whole body photography. Patient understands that photograph costs may not be covered by insurance, and patient is ultimately responsible for payment.
Number Of Photographs (Optional- Will Not Render If 0): 1
Whole Body Statement: The whole body was photographed today.
Was The Entire Body Photographed (Cannot Bill Unless Entire Body Photographed)?: No

## 2017-09-13 NOTE — PROCEDURE: MEDICAL PHOTOGRAPHY REVIEW
Number Of Photographs Reviewed: 1
Detail Level: Detailed
Review Findings: no new or changing lesions

## 2018-03-14 ENCOUNTER — APPOINTMENT (RX ONLY)
Dept: URBAN - METROPOLITAN AREA CLINIC 349 | Facility: CLINIC | Age: 73
Setting detail: DERMATOLOGY
End: 2018-03-14

## 2018-03-14 DIAGNOSIS — D22 MELANOCYTIC NEVI: ICD-10-CM

## 2018-03-14 DIAGNOSIS — L20.89 OTHER ATOPIC DERMATITIS: ICD-10-CM | Status: STABLE

## 2018-03-14 DIAGNOSIS — L57.0 ACTINIC KERATOSIS: ICD-10-CM

## 2018-03-14 PROBLEM — D22.71 MELANOCYTIC NEVI OF RIGHT LOWER LIMB, INCLUDING HIP: Status: ACTIVE | Noted: 2018-03-14

## 2018-03-14 PROBLEM — L20.84 INTRINSIC (ALLERGIC) ECZEMA: Status: ACTIVE | Noted: 2018-03-14

## 2018-03-14 PROCEDURE — A4550 SURGICAL TRAYS: HCPCS

## 2018-03-14 PROCEDURE — 99213 OFFICE O/P EST LOW 20 MIN: CPT | Mod: 25

## 2018-03-14 PROCEDURE — ? OTHER

## 2018-03-14 PROCEDURE — ? SHAVE REMOVAL

## 2018-03-14 PROCEDURE — 11306 SHAVE SKIN LESION 0.6-1.0 CM: CPT

## 2018-03-14 PROCEDURE — ? PRESCRIPTION

## 2018-03-14 PROCEDURE — ? COUNSELING

## 2018-03-14 RX ORDER — DESOXIMETASONE 2.5 MG/G
CREAM TOPICAL
Qty: 1 | Refills: 2 | Status: ERX | COMMUNITY
Start: 2018-03-14

## 2018-03-14 RX ADMIN — DESOXIMETASONE: 2.5 CREAM TOPICAL at 14:34

## 2018-03-14 ASSESSMENT — PAIN INTENSITY VAS: HOW INTENSE IS YOUR PAIN 0 BEING NO PAIN, 10 BEING THE MOST SEVERE PAIN POSSIBLE?: NO PAIN

## 2018-03-14 ASSESSMENT — LOCATION DETAILED DESCRIPTION DERM
LOCATION DETAILED: RIGHT DISTAL DORSAL FOREARM
LOCATION DETAILED: RIGHT MEDIAL 5TH TOE
LOCATION DETAILED: LEFT DISTAL PRETIBIAL REGION
LOCATION DETAILED: LEFT PROXIMAL DORSAL FOREARM
LOCATION DETAILED: RIGHT DISTAL PRETIBIAL REGION
LOCATION DETAILED: RIGHT CENTRAL EYEBROW

## 2018-03-14 ASSESSMENT — LOCATION SIMPLE DESCRIPTION DERM
LOCATION SIMPLE: RIGHT FOREARM
LOCATION SIMPLE: RIGHT 5TH TOE
LOCATION SIMPLE: RIGHT PRETIBIAL REGION
LOCATION SIMPLE: LEFT PRETIBIAL REGION
LOCATION SIMPLE: RIGHT EYEBROW
LOCATION SIMPLE: LEFT FOREARM

## 2018-03-14 ASSESSMENT — SEVERITY ASSESSMENT: SEVERITY: MILD

## 2018-03-14 ASSESSMENT — LOCATION ZONE DERM
LOCATION ZONE: ARM
LOCATION ZONE: LEG
LOCATION ZONE: FACE
LOCATION ZONE: TOE

## 2018-03-14 NOTE — PROCEDURE: SHAVE REMOVAL
Post-Care Instructions: I reviewed with the patient in detail post-care instructions. Patient is to keep the biopsy site dry overnight, and then apply Vaseline daily until healed. Patient may apply hydrogen peroxide soaks to remove any crusting. After the procedure, the patient was oriented to person, place, and time. Patient denied feeling dizzy, queasy, and and declined further observation after initial 5 minute observation time.
Medical Necessity Clause: This procedure was medically necessary because the lesion has a history of change
Consent was obtained from the patient. The risks and benefits to therapy were discussed in detail. Specifically, the risks of infection, scarring, bleeding, prolonged wound healing, incomplete removal, allergy to anesthesia, nerve injury and recurrence were addressed. Prior to the procedure, the treatment site was clearly identified and confirmed by the patient. All components of Universal Protocol/PAUSE Rule completed.
Bill For Surgical Tray: yes
Size Of Lesion In Cm (Required): 0.6
Detail Level: Detailed
X Size Of Lesion In Cm (Optional): 0
Anesthesia Volume In Cc: 1.5
Hemostasis: Aluminum Chloride
Anesthesia Type: 1% lidocaine with epinephrine and a 1:10 solution of 8.4% sodium bicarbonate
Wound Care: Vaseline
Billing Type: Third-Party Bill
Biopsy Method: Dermablade
Bill 19454 For Specimen Handling/Conveyance To Laboratory?: no
Accession #: path cons
Notification Instructions: Patient will be notified of biopsy results. However, patient instructed to call the office if not contacted within 2 weeks.
Medical Necessity Information: It is in your best interest to select a reason for this procedure from the list below. All of these items fulfill various CMS LCD requirements except the new and changing color options.

## 2018-03-14 NOTE — PROCEDURE: OTHER
Note Text (......Xxx Chief Complaint.): This diagnosis correlates with the
Other (Free Text): Compared to photo from previous visit and AK resolved with cryo treatment.
Detail Level: Detailed

## 2018-09-19 ENCOUNTER — APPOINTMENT (RX ONLY)
Dept: URBAN - METROPOLITAN AREA CLINIC 349 | Facility: CLINIC | Age: 73
Setting detail: DERMATOLOGY
End: 2018-09-19

## 2018-09-19 DIAGNOSIS — L57.0 ACTINIC KERATOSIS: ICD-10-CM

## 2018-09-19 DIAGNOSIS — L20.89 OTHER ATOPIC DERMATITIS: ICD-10-CM | Status: WELL CONTROLLED

## 2018-09-19 DIAGNOSIS — D22 MELANOCYTIC NEVI: ICD-10-CM | Status: STABLE

## 2018-09-19 PROBLEM — D22.5 MELANOCYTIC NEVI OF TRUNK: Status: ACTIVE | Noted: 2018-09-19

## 2018-09-19 PROBLEM — J45.909 UNSPECIFIED ASTHMA, UNCOMPLICATED: Status: ACTIVE | Noted: 2018-09-19

## 2018-09-19 PROBLEM — L20.84 INTRINSIC (ALLERGIC) ECZEMA: Status: ACTIVE | Noted: 2018-09-19

## 2018-09-19 PROBLEM — E78.5 HYPERLIPIDEMIA, UNSPECIFIED: Status: ACTIVE | Noted: 2018-09-19

## 2018-09-19 PROBLEM — D22.71 MELANOCYTIC NEVI OF RIGHT LOWER LIMB, INCLUDING HIP: Status: ACTIVE | Noted: 2018-09-19

## 2018-09-19 PROCEDURE — ? PRESCRIPTION

## 2018-09-19 PROCEDURE — 99214 OFFICE O/P EST MOD 30 MIN: CPT | Mod: 25

## 2018-09-19 PROCEDURE — 17003 DESTRUCT PREMALG LES 2-14: CPT

## 2018-09-19 PROCEDURE — ? MEDICAL PHOTOGRAPHY REVIEW

## 2018-09-19 PROCEDURE — 17000 DESTRUCT PREMALG LESION: CPT

## 2018-09-19 PROCEDURE — ? LIQUID NITROGEN

## 2018-09-19 PROCEDURE — ? COUNSELING

## 2018-09-19 RX ORDER — TRIAMCINOLONE ACETONIDE 1 MG/G
CREAM TOPICAL
Qty: 1 | Refills: 1 | Status: ERX | COMMUNITY
Start: 2018-09-19

## 2018-09-19 RX ADMIN — TRIAMCINOLONE ACETONIDE: 1 CREAM TOPICAL at 13:12

## 2018-09-19 ASSESSMENT — LOCATION DETAILED DESCRIPTION DERM
LOCATION DETAILED: RIGHT MEDIAL 5TH TOE
LOCATION DETAILED: RIGHT DISTAL DORSAL FOREARM
LOCATION DETAILED: RIGHT DISTAL PRETIBIAL REGION
LOCATION DETAILED: LEFT DISTAL PRETIBIAL REGION
LOCATION DETAILED: RIGHT MEDIAL MALAR CHEEK
LOCATION DETAILED: LEFT MEDIAL MALAR CHEEK
LOCATION DETAILED: RIGHT SUPERIOR MEDIAL LOWER BACK
LOCATION DETAILED: LEFT PROXIMAL DORSAL FOREARM
LOCATION DETAILED: NASAL DORSUM

## 2018-09-19 ASSESSMENT — LOCATION SIMPLE DESCRIPTION DERM
LOCATION SIMPLE: RIGHT 5TH TOE
LOCATION SIMPLE: LEFT CHEEK
LOCATION SIMPLE: NOSE
LOCATION SIMPLE: RIGHT FOREARM
LOCATION SIMPLE: LEFT FOREARM
LOCATION SIMPLE: LEFT PRETIBIAL REGION
LOCATION SIMPLE: RIGHT PRETIBIAL REGION
LOCATION SIMPLE: RIGHT LOWER BACK
LOCATION SIMPLE: RIGHT CHEEK

## 2018-09-19 ASSESSMENT — LOCATION ZONE DERM
LOCATION ZONE: LEG
LOCATION ZONE: ARM
LOCATION ZONE: TRUNK
LOCATION ZONE: TOE
LOCATION ZONE: NOSE
LOCATION ZONE: FACE

## 2018-09-19 NOTE — PROCEDURE: LIQUID NITROGEN
Duration Of Freeze Thaw-Cycle (Seconds): 3
Render Post-Care Instructions In Note?: no
Detail Level: Detailed
Post-Care Instructions: I reviewed with the patient in detail post-care instructions. Patient is to wear sunprotection, and avoid picking at any of the treated lesions. Pt may apply Vaseline to crusted or scabbing areas.
Number Of Freeze-Thaw Cycles: 2 freeze-thaw cycles
Consent: The patient's consent was obtained including but not limited to risks of crusting, scabbing, blistering, scarring, darker or lighter pigmentary change, recurrence, incomplete removal and infection.

## 2019-10-22 ENCOUNTER — APPOINTMENT (RX ONLY)
Dept: URBAN - METROPOLITAN AREA CLINIC 349 | Facility: CLINIC | Age: 74
Setting detail: DERMATOLOGY
End: 2019-10-22

## 2019-10-22 DIAGNOSIS — L20.89 OTHER ATOPIC DERMATITIS: ICD-10-CM | Status: IMPROVED

## 2019-10-22 DIAGNOSIS — D22 MELANOCYTIC NEVI: ICD-10-CM | Status: STABLE

## 2019-10-22 DIAGNOSIS — D69.2 OTHER NONTHROMBOCYTOPENIC PURPURA: ICD-10-CM

## 2019-10-22 DIAGNOSIS — L57.0 ACTINIC KERATOSIS: ICD-10-CM

## 2019-10-22 PROBLEM — D22.5 MELANOCYTIC NEVI OF TRUNK: Status: ACTIVE | Noted: 2019-10-22

## 2019-10-22 PROBLEM — L20.84 INTRINSIC (ALLERGIC) ECZEMA: Status: ACTIVE | Noted: 2019-10-22

## 2019-10-22 PROCEDURE — ? LIQUID NITROGEN

## 2019-10-22 PROCEDURE — 99214 OFFICE O/P EST MOD 30 MIN: CPT | Mod: 25

## 2019-10-22 PROCEDURE — 17003 DESTRUCT PREMALG LES 2-14: CPT

## 2019-10-22 PROCEDURE — ? COUNSELING

## 2019-10-22 PROCEDURE — 17000 DESTRUCT PREMALG LESION: CPT

## 2019-10-22 PROCEDURE — ? OTHER

## 2019-10-22 PROCEDURE — ? TREATMENT REGIMEN

## 2019-10-22 PROCEDURE — ? BODY PHOTOGRAPHY

## 2019-10-22 ASSESSMENT — LOCATION ZONE DERM
LOCATION ZONE: LEG
LOCATION ZONE: ARM
LOCATION ZONE: FACE
LOCATION ZONE: TRUNK

## 2019-10-22 ASSESSMENT — LOCATION SIMPLE DESCRIPTION DERM
LOCATION SIMPLE: RIGHT LOWER BACK
LOCATION SIMPLE: RIGHT FOREARM
LOCATION SIMPLE: LEFT PRETIBIAL REGION
LOCATION SIMPLE: RIGHT PRETIBIAL REGION
LOCATION SIMPLE: UPPER BACK
LOCATION SIMPLE: LEFT FOREARM
LOCATION SIMPLE: RIGHT EYEBROW
LOCATION SIMPLE: LEFT CHEEK

## 2019-10-22 ASSESSMENT — PAIN INTENSITY VAS: HOW INTENSE IS YOUR PAIN 0 BEING NO PAIN, 10 BEING THE MOST SEVERE PAIN POSSIBLE?: 1/10 PAIN

## 2019-10-22 ASSESSMENT — LOCATION DETAILED DESCRIPTION DERM
LOCATION DETAILED: LEFT DISTAL PRETIBIAL REGION
LOCATION DETAILED: INFERIOR THORACIC SPINE
LOCATION DETAILED: LEFT PROXIMAL RADIAL DORSAL FOREARM
LOCATION DETAILED: RIGHT SUPERIOR MEDIAL LOWER BACK
LOCATION DETAILED: LEFT INFERIOR CENTRAL MALAR CHEEK
LOCATION DETAILED: RIGHT DISTAL DORSAL FOREARM
LOCATION DETAILED: RIGHT CENTRAL EYEBROW
LOCATION DETAILED: RIGHT DISTAL PRETIBIAL REGION
LOCATION DETAILED: LEFT PROXIMAL DORSAL FOREARM

## 2019-10-22 ASSESSMENT — SEVERITY ASSESSMENT: SEVERITY: CLEAR

## 2019-10-22 NOTE — PROCEDURE: TREATMENT REGIMEN
Samples Given: Cerave anti-
Detail Level: Detailed
Samples Given: Cerave anti itch lotion apply twice daily as needed, Cerave cream apply twice daily as needed

## 2019-10-22 NOTE — PROCEDURE: OTHER
Note Text (......Xxx Chief Complaint.): This diagnosis correlates with the
Other (Free Text): Patient has been treating with 5-fluorouracil cream twice daily for about 2 weeks. \\nRecommended patient continue treatment
Detail Level: Detailed

## 2019-10-22 NOTE — PROCEDURE: BODY PHOTOGRAPHY
Detail Level: Simple
Was The Entire Body Photographed (Cannot Bill Unless Entire Body Photographed)?: No
Reason For Photography: The patient is obtaining body photography to observe existing suspicious moles and or monitor for the appearance of any new lesions.
Number Of Photographs (Optional- Will Not Render If 0): 1
Consent: Written consent obtained, risks reviewed for whole body photography. Patient understands that photograph costs may not be covered by insurance, and patient is ultimately responsible for payment.
Whole Body Statement: The whole body was photographed today.

## 2019-10-22 NOTE — PROCEDURE: LIQUID NITROGEN
Duration Of Freeze Thaw-Cycle (Seconds): 3
Number Of Freeze-Thaw Cycles: 2 freeze-thaw cycles
Consent: The patient's consent was obtained including but not limited to risks of crusting, scabbing, blistering, scarring, darker or lighter pigmentary change, recurrence, incomplete removal and infection.
Render Post-Care Instructions In Note?: no
Post-Care Instructions: I reviewed with the patient in detail post-care instructions. Patient is to wear sunprotection, and avoid picking at any of the treated lesions. Pt may apply Vaseline to crusted or scabbing areas.
Detail Level: Detailed

## 2020-03-02 NOTE — PROCEDURE: LIQUID NITROGEN
Consent: The patient's consent was obtained including but not limited to risks of crusting, scabbing, blistering, scarring, darker or lighter pigmentary change, recurrence, incomplete removal and infection.
Requested medication(s) are due for refill today: Yes  Patient has already received a courtesy refill: No  Other reason request has been forwarded to provider:    PER PROTOCOL:    patient is younger than 22years old, look for completion of a suicide screening questionnaire within the last 6 months    PHQ-9 within 180 days      YOU HAVE NOT PRESCRIBED THIS BEFORE, PLEASE ADVISE
Render Post-Care Instructions In Note?: no
Duration Of Freeze Thaw-Cycle (Seconds): 0
Post-Care Instructions: I reviewed with the patient in detail post-care instructions. Patient is to wear sunprotection, and avoid picking at any of the treated lesions. Pt may apply Vaseline to crusted or scabbing areas.
Number Of Freeze-Thaw Cycles: 2 freeze-thaw cycles
Detail Level: Detailed

## 2020-08-25 NOTE — PROCEDURE: OTHER
Detail Level: Detailed
Note Text (......Xxx Chief Complaint.): This diagnosis correlates with the
Other (Free Text): Patient treated with Carac cream once daily x2 weeks and AK is still present. Dr. Cooper advised the patient that she will remaining lesion today.
None

## 2020-10-22 ENCOUNTER — APPOINTMENT (RX ONLY)
Dept: URBAN - METROPOLITAN AREA CLINIC 349 | Facility: CLINIC | Age: 75
Setting detail: DERMATOLOGY
End: 2020-10-22

## 2020-10-22 DIAGNOSIS — L57.0 ACTINIC KERATOSIS: ICD-10-CM

## 2020-10-22 DIAGNOSIS — L82.0 INFLAMED SEBORRHEIC KERATOSIS: ICD-10-CM

## 2020-10-22 DIAGNOSIS — L20.89 OTHER ATOPIC DERMATITIS: ICD-10-CM | Status: WELL CONTROLLED

## 2020-10-22 DIAGNOSIS — D22 MELANOCYTIC NEVI: ICD-10-CM | Status: STABLE

## 2020-10-22 PROBLEM — D22.5 MELANOCYTIC NEVI OF TRUNK: Status: ACTIVE | Noted: 2020-10-22

## 2020-10-22 PROBLEM — L20.84 INTRINSIC (ALLERGIC) ECZEMA: Status: ACTIVE | Noted: 2020-10-22

## 2020-10-22 PROCEDURE — ? PRESCRIPTION

## 2020-10-22 PROCEDURE — 17000 DESTRUCT PREMALG LESION: CPT | Mod: 59

## 2020-10-22 PROCEDURE — 17110 DESTRUCTION B9 LES UP TO 14: CPT

## 2020-10-22 PROCEDURE — ? COUNSELING

## 2020-10-22 PROCEDURE — 99214 OFFICE O/P EST MOD 30 MIN: CPT | Mod: 25

## 2020-10-22 PROCEDURE — ? LIQUID NITROGEN

## 2020-10-22 PROCEDURE — ? MEDICAL PHOTOGRAPHY REVIEW

## 2020-10-22 RX ORDER — DESOXIMETASONE 2.5 MG/G
OINTMENT TOPICAL
Qty: 1 | Refills: 2 | Status: ERX | COMMUNITY
Start: 2020-10-22

## 2020-10-22 RX ADMIN — DESOXIMETASONE: 2.5 OINTMENT TOPICAL at 00:00

## 2020-10-22 ASSESSMENT — PAIN INTENSITY VAS: HOW INTENSE IS YOUR PAIN 0 BEING NO PAIN, 10 BEING THE MOST SEVERE PAIN POSSIBLE?: 1/10 PAIN

## 2020-10-22 ASSESSMENT — LOCATION DETAILED DESCRIPTION DERM
LOCATION DETAILED: LEFT PROXIMAL RADIAL DORSAL FOREARM
LOCATION DETAILED: RIGHT SUPERIOR MEDIAL LOWER BACK
LOCATION DETAILED: RIGHT DISTAL PRETIBIAL REGION
LOCATION DETAILED: RIGHT CENTRAL EYEBROW
LOCATION DETAILED: RIGHT DISTAL DORSAL FOREARM
LOCATION DETAILED: INFERIOR THORACIC SPINE
LOCATION DETAILED: LEFT PROXIMAL DORSAL FOREARM
LOCATION DETAILED: LEFT DISTAL PRETIBIAL REGION

## 2020-10-22 ASSESSMENT — LOCATION ZONE DERM
LOCATION ZONE: TRUNK
LOCATION ZONE: LEG
LOCATION ZONE: FACE
LOCATION ZONE: ARM

## 2020-10-22 ASSESSMENT — LOCATION SIMPLE DESCRIPTION DERM
LOCATION SIMPLE: RIGHT EYEBROW
LOCATION SIMPLE: RIGHT PRETIBIAL REGION
LOCATION SIMPLE: LEFT PRETIBIAL REGION
LOCATION SIMPLE: RIGHT FOREARM
LOCATION SIMPLE: RIGHT LOWER BACK
LOCATION SIMPLE: LEFT FOREARM
LOCATION SIMPLE: UPPER BACK

## 2020-10-22 ASSESSMENT — SEVERITY ASSESSMENT 2020: SEVERITY 2020: MILD

## 2020-10-22 NOTE — PROCEDURE: LIQUID NITROGEN
Duration Of Freeze Thaw-Cycle (Seconds): 3
Medical Necessity Clause: This procedure was medically necessary because the lesions that were treated were:
Render Post-Care Instructions In Note?: no
Duration Of Freeze Thaw-Cycle (Seconds): 5-10
Include Z78.9 (Other Specified Conditions Influencing Health Status) As An Associated Diagnosis?: Yes
Detail Level: Detailed
Medical Necessity Information: It is in your best interest to select a reason for this procedure from the list below. All of these items fulfill various CMS LCD requirements except the new and changing color options.
Consent: The patient's consent was obtained including but not limited to risks of crusting, scabbing, blistering, scarring, darker or lighter pigmentary change, recurrence, incomplete removal and infection.
Post-Care Instructions: I reviewed with the patient in detail post-care instructions. Patient is to wear sunprotection, and avoid picking at any of the treated lesions. Pt may apply Vaseline to crusted or scabbing areas.
Number Of Freeze-Thaw Cycles: 2 freeze-thaw cycles

## 2021-09-22 ENCOUNTER — APPOINTMENT (RX ONLY)
Dept: URBAN - METROPOLITAN AREA CLINIC 349 | Facility: CLINIC | Age: 76
Setting detail: DERMATOLOGY
End: 2021-09-22

## 2021-09-22 DIAGNOSIS — L20.89 OTHER ATOPIC DERMATITIS: ICD-10-CM | Status: WELL CONTROLLED

## 2021-09-22 DIAGNOSIS — L57.0 ACTINIC KERATOSIS: ICD-10-CM

## 2021-09-22 DIAGNOSIS — L57.8 OTHER SKIN CHANGES DUE TO CHRONIC EXPOSURE TO NONIONIZING RADIATION: ICD-10-CM

## 2021-09-22 DIAGNOSIS — L85.3 XEROSIS CUTIS: ICD-10-CM

## 2021-09-22 DIAGNOSIS — D22 MELANOCYTIC NEVI: ICD-10-CM | Status: STABLE

## 2021-09-22 PROBLEM — L20.84 INTRINSIC (ALLERGIC) ECZEMA: Status: ACTIVE | Noted: 2021-09-22

## 2021-09-22 PROBLEM — D22.5 MELANOCYTIC NEVI OF TRUNK: Status: ACTIVE | Noted: 2021-09-22

## 2021-09-22 PROCEDURE — ? PRESCRIPTION MEDICATION MANAGEMENT

## 2021-09-22 PROCEDURE — ? MEDICAL PHOTOGRAPHY REVIEW

## 2021-09-22 PROCEDURE — 17003 DESTRUCT PREMALG LES 2-14: CPT

## 2021-09-22 PROCEDURE — ? LIQUID NITROGEN

## 2021-09-22 PROCEDURE — ? SUNSCREEN RECOMMENDATIONS

## 2021-09-22 PROCEDURE — ? COUNSELING

## 2021-09-22 PROCEDURE — 17000 DESTRUCT PREMALG LESION: CPT

## 2021-09-22 PROCEDURE — 99214 OFFICE O/P EST MOD 30 MIN: CPT | Mod: 25

## 2021-09-22 ASSESSMENT — LOCATION SIMPLE DESCRIPTION DERM
LOCATION SIMPLE: RIGHT LOWER BACK
LOCATION SIMPLE: LEFT FOREARM
LOCATION SIMPLE: CHEST
LOCATION SIMPLE: LEFT PRETIBIAL REGION
LOCATION SIMPLE: RIGHT FOREARM
LOCATION SIMPLE: INFERIOR FOREHEAD
LOCATION SIMPLE: UPPER BACK
LOCATION SIMPLE: NOSE
LOCATION SIMPLE: RIGHT PRETIBIAL REGION
LOCATION SIMPLE: RIGHT NOSE
LOCATION SIMPLE: LEFT CHEEK

## 2021-09-22 ASSESSMENT — LOCATION ZONE DERM
LOCATION ZONE: LEG
LOCATION ZONE: NOSE
LOCATION ZONE: FACE
LOCATION ZONE: TRUNK
LOCATION ZONE: ARM

## 2021-09-22 ASSESSMENT — LOCATION DETAILED DESCRIPTION DERM
LOCATION DETAILED: UPPER STERNUM
LOCATION DETAILED: LEFT DISTAL PRETIBIAL REGION
LOCATION DETAILED: RIGHT DISTAL DORSAL FOREARM
LOCATION DETAILED: RIGHT SUPERIOR MEDIAL LOWER BACK
LOCATION DETAILED: INFERIOR THORACIC SPINE
LOCATION DETAILED: RIGHT PROXIMAL PRETIBIAL REGION
LOCATION DETAILED: RIGHT DISTAL PRETIBIAL REGION
LOCATION DETAILED: LEFT MEDIAL MALAR CHEEK
LOCATION DETAILED: LEFT LATERAL PROXIMAL PRETIBIAL REGION
LOCATION DETAILED: NASAL DORSUM
LOCATION DETAILED: INFERIOR MID FOREHEAD
LOCATION DETAILED: LEFT PROXIMAL DORSAL FOREARM
LOCATION DETAILED: RIGHT NASAL SIDEWALL

## 2021-09-22 ASSESSMENT — PAIN INTENSITY VAS: HOW INTENSE IS YOUR PAIN 0 BEING NO PAIN, 10 BEING THE MOST SEVERE PAIN POSSIBLE?: 1/10 PAIN

## 2021-09-22 NOTE — PROCEDURE: LIQUID NITROGEN
Render Post-Care Instructions In Note?: no
Duration Of Freeze Thaw-Cycle (Seconds): 3
Post-Care Instructions: I reviewed with the patient in detail post-care instructions. Patient is to wear sunprotection, and avoid picking at any of the treated lesions. Pt may apply Vaseline to crusted or scabbing areas.
Show Aperture Variable?: Yes
Detail Level: Detailed
Consent: The patient's consent was obtained including but not limited to risks of crusting, scabbing, blistering, scarring, darker or lighter pigmentary change, recurrence, incomplete removal and infection.
Number Of Freeze-Thaw Cycles: 2 freeze-thaw cycles

## 2021-09-22 NOTE — PROCEDURE: PRESCRIPTION MEDICATION MANAGEMENT
Detail Level: Zone
Render In Strict Bullet Format?: No
Continue Regimen: Desoximetasone 0.25% ointment apply twice daily as needed for flares, patient does not need refills at this time.\\nDiscussed side effects and appropriate use of topical steroids.
Samples Given: Eucerin advanced repair cream with urea. Apply to affected area twice daily. \\nUrea helps slough off dead skin and draw in moisture.

## 2022-08-08 NOTE — PROCEDURE: COUNSELING
Specialty Pharmacy - Refill Coordination    Specialty Medication Orders Linked to Encounter    Flowsheet Row Most Recent Value   Medication #1 evolocumab (REPATHA SURECLICK) 140 mg/mL PnIj (Order#[id number], Rx#[id number])          Refill Questions - Documented Responses    Flowsheet Row Most Recent Value   Patient Availability and HIPAA Verification    Does patient want to proceed with activity? Yes   HIPAA/medical authority confirmed? Yes   Relationship to patient of person spoken to? Self   Refill Screening Questions    Would patient like to speak to a pharmacist? No   When does the patient need to receive the medication? 08/15/22   Refill Delivery Questions    How will the patient receive the medication? Delivery Guillermina   When does the patient need to receive the medication? 08/15/22   Shipping Address Home   Address in [facility] confirmed and updated if neccessary? Yes   Expected Copay ($) 4   Is the patient able to afford the medication copay? Yes   Payment Method CC on file   Days supply of Refill 28   Supplies needed? Alcohol Swabs   Refill activity completed? Yes   Refill activity plan Refill scheduled   Shipment/Pickup Date: 08/10/22          Current Outpatient Medications   Medication Sig    biotin 10,000 mcg Cap Take by mouth.    budesonide 1 mg/2 mL NbSp EMPTY CONTENTS OF 1 RESPULE INTO NASAL IRRIGATION SYSTEM, ADD DISTILLED WATER, SALT PACK, MIX & IRRIGATE. PERFORM TWICE DAILY    cholecalciferol, vitamin D3, capsule/tablet Take by mouth once daily.    clobetasoL (TEMOVATE) 0.05 % external solution Apply TO SCALP in AREAS of HAIR loss AT betime nightly monday - FRIDAY    doxycycline (MONODOX) 100 MG capsule TAKE ONE CAPSULE BY MOUTH ONCE DAILY with food/yogurt AND tall GLASS of water    doxycycline (VIBRAMYCIN) 100 MG Cap Take 100 mg by mouth 2 (two) times daily.    evolocumab (REPATHA SURECLICK) 140 mg/mL PnIj Inject 1 mL (140 mg total) into the skin every 14 (fourteen) days.     finasteride (PROSCAR) 5 mg tablet Take 5 mg by mouth once daily.    fluconazole (DIFLUCAN) 150 MG Tab TAKE ONE TABLET BY MOUTH ONCE as A single DOSE    fluocinonide (LIDEX) 0.05 % external solution apply 1ml TO SCALP ONCE TO twice daily AS DIRECTED    fluticasone propionate (FLONASE) 50 mcg/actuation nasal spray 1 spray (50 mcg total) by Each Nostril route once daily.    hydrocortisone (ANUSOL-HC) 2.5 % rectal cream Place rectally 2 (two) times daily.    hydroquinone 2 % Crea APPLY TO AFFECTED AREA(S) 1-2 TIMES DAILY    magnesium 250 mg Tab     meloxicam (MOBIC) 15 MG tablet TAKE ONE TABLET BY MOUTH ONCE DAILY    multivit/folic acid/vit K1 (ONE-A-DAY WOMEN'S 50 PLUS ORAL) Take by mouth.    NEILMED SINUS RINSE COMPLETE pkdv use as directed    pantoprazole (PROTONIX) 40 MG tablet TAKE 1 TABLET BY MOUTH once DAILY    tobramycin, PF, (OPHELIA) 300 mg/5 mL nebulizer solution EMPTY CONTENTS OF 1 AMPULE INTO NASAL IRRIGATION SYSTEM, ADD DISTILLED WATER, SALT PACK, MIX & IRRIGATE. PERFORM 2 TIMES DAILY    triamcinolone acetonide 0.1% (KENALOG) 0.1 % ointment Apply TO SCALP in AREAS of HAIR loss AT bedtime   Last reviewed on 7/13/2022 10:38 AM by Keron Cristina Jr., MD    Review of patient's allergies indicates:  No Known Allergies Last reviewed on  7/13/2022 10:38 AM by Keron Cristina      Tasks added this encounter   No tasks added.   Tasks due within next 3 months   8/8/2022 - Refill Call (Auto Added)     Ginger Heredia, PharmD  [facility]  [address] [place] [id number]  Phone: [phone]  Fax: [phone]       Detail Level: Detailed

## 2022-09-22 ENCOUNTER — APPOINTMENT (RX ONLY)
Dept: URBAN - METROPOLITAN AREA CLINIC 330 | Facility: CLINIC | Age: 77
Setting detail: DERMATOLOGY
End: 2022-09-22

## 2022-09-22 DIAGNOSIS — L20.89 OTHER ATOPIC DERMATITIS: ICD-10-CM | Status: WELL CONTROLLED

## 2022-09-22 DIAGNOSIS — L57.0 ACTINIC KERATOSIS: ICD-10-CM

## 2022-09-22 DIAGNOSIS — D22 MELANOCYTIC NEVI: ICD-10-CM | Status: STABLE

## 2022-09-22 DIAGNOSIS — L82.0 INFLAMED SEBORRHEIC KERATOSIS: ICD-10-CM

## 2022-09-22 PROBLEM — D22.5 MELANOCYTIC NEVI OF TRUNK: Status: ACTIVE | Noted: 2022-09-22

## 2022-09-22 PROBLEM — L20.84 INTRINSIC (ALLERGIC) ECZEMA: Status: ACTIVE | Noted: 2022-09-22

## 2022-09-22 PROCEDURE — 17003 DESTRUCT PREMALG LES 2-14: CPT | Mod: 59

## 2022-09-22 PROCEDURE — 99213 OFFICE O/P EST LOW 20 MIN: CPT | Mod: 25

## 2022-09-22 PROCEDURE — ? PRESCRIPTION MEDICATION MANAGEMENT

## 2022-09-22 PROCEDURE — ? COUNSELING

## 2022-09-22 PROCEDURE — 17110 DESTRUCTION B9 LES UP TO 14: CPT

## 2022-09-22 PROCEDURE — ? LIQUID NITROGEN

## 2022-09-22 PROCEDURE — ? FULL BODY SKIN EXAM

## 2022-09-22 PROCEDURE — ? MEDICAL PHOTOGRAPHY REVIEW

## 2022-09-22 PROCEDURE — ? OTHER

## 2022-09-22 PROCEDURE — 17000 DESTRUCT PREMALG LESION: CPT | Mod: 59

## 2022-09-22 ASSESSMENT — LOCATION DETAILED DESCRIPTION DERM
LOCATION DETAILED: INFERIOR THORACIC SPINE
LOCATION DETAILED: NASAL DORSUM
LOCATION DETAILED: LEFT MID TEMPLE
LOCATION DETAILED: RIGHT DISTAL DORSAL FOREARM
LOCATION DETAILED: RIGHT INFERIOR FOREHEAD
LOCATION DETAILED: RIGHT NASAL ALA
LOCATION DETAILED: LEFT INFERIOR FOREHEAD
LOCATION DETAILED: RIGHT DISTAL PRETIBIAL REGION
LOCATION DETAILED: RIGHT PROXIMAL RADIAL DORSAL INDEX FINGER
LOCATION DETAILED: RIGHT NASAL SIDEWALL
LOCATION DETAILED: RIGHT SUPERIOR MEDIAL LOWER BACK
LOCATION DETAILED: LEFT DISTAL PRETIBIAL REGION
LOCATION DETAILED: LEFT PROXIMAL DORSAL FOREARM
LOCATION DETAILED: RIGHT MEDIAL MALAR CHEEK

## 2022-09-22 ASSESSMENT — LOCATION ZONE DERM
LOCATION ZONE: FINGER
LOCATION ZONE: FACE
LOCATION ZONE: ARM
LOCATION ZONE: NOSE
LOCATION ZONE: TRUNK
LOCATION ZONE: LEG

## 2022-09-22 ASSESSMENT — LOCATION SIMPLE DESCRIPTION DERM
LOCATION SIMPLE: RIGHT INDEX FINGER
LOCATION SIMPLE: UPPER BACK
LOCATION SIMPLE: RIGHT CHEEK
LOCATION SIMPLE: LEFT FOREARM
LOCATION SIMPLE: RIGHT PRETIBIAL REGION
LOCATION SIMPLE: RIGHT LOWER BACK
LOCATION SIMPLE: RIGHT FOREHEAD
LOCATION SIMPLE: LEFT TEMPLE
LOCATION SIMPLE: LEFT PRETIBIAL REGION
LOCATION SIMPLE: RIGHT NOSE
LOCATION SIMPLE: NOSE
LOCATION SIMPLE: LEFT FOREHEAD
LOCATION SIMPLE: RIGHT FOREARM

## 2022-09-22 ASSESSMENT — SEVERITY ASSESSMENT 2020: SEVERITY 2020: CLEAR

## 2022-09-22 NOTE — PROCEDURE: MIPS QUALITY
Quality 226: Preventive Care And Screening: Tobacco Use: Screening And Cessation Intervention: Patient screened for tobacco use and is an ex/non-smoker
Quality 130: Documentation Of Current Medications In The Medical Record: Current Medications Documented
Quality 431: Preventive Care And Screening: Unhealthy Alcohol Use - Screening: Patient not identified as an unhealthy alcohol user when screened for unhealthy alcohol use using a systematic screening method
Quality 402: Tobacco Use And Help With Quitting Among Adolescents: Patient screened for tobacco and never smoked
Detail Level: Detailed
Quality 47: Advance Care Plan: Advance care planning not documented, reason not otherwise specified.
Quality 110: Preventive Care And Screening: Influenza Immunization: Influenza Immunization Administered during Influenza season
Quality 111:Pneumonia Vaccination Status For Older Adults: Pneumococcal vaccine administered on or after patient’s 60th birthday and before the end of the measurement period

## 2022-09-22 NOTE — PROCEDURE: PRESCRIPTION MEDICATION MANAGEMENT
Render In Strict Bullet Format?: No
Continue Regimen: Desoximetasone 0.25% ointment apply twice daily as needed for flares, patient does not need refills at this time.\\nDiscussed side effects and appropriate use of topical steroids.
Detail Level: Zone
no fever/no chills

## 2022-09-22 NOTE — PROCEDURE: LIQUID NITROGEN
Show Applicator Variable?: Yes
Number Of Freeze-Thaw Cycles: 3 freeze-thaw cycles
Duration Of Freeze Thaw-Cycle (Seconds): 2
Post-Care Instructions: I reviewed with the patient in detail post-care instructions. Patient is to wear sunprotection, and avoid picking at any of the treated lesions. Pt may apply Vaseline to crusted or scabbing areas.
Render Note In Bullet Format When Appropriate: No
Detail Level: Detailed
Consent: The patient's consent was obtained including but not limited to risks of crusting, scabbing, blistering, scarring, darker or lighter pigmentary change, recurrence, incomplete removal and infection.
Spray Paint Text: The liquid nitrogen was applied to the skin utilizing a spray paint frosting technique.
Medical Necessity Information: It is in your best interest to select a reason for this procedure from the list below. All of these items fulfill various CMS LCD requirements except the new and changing color options.
Medical Necessity Clause: This procedure was medically necessary because the lesions that were treated were:

## 2023-12-12 ENCOUNTER — APPOINTMENT (RX ONLY)
Dept: URBAN - METROPOLITAN AREA CLINIC 330 | Facility: CLINIC | Age: 78
Setting detail: DERMATOLOGY
End: 2023-12-12

## 2023-12-12 DIAGNOSIS — L82.0 INFLAMED SEBORRHEIC KERATOSIS: ICD-10-CM

## 2023-12-12 DIAGNOSIS — L57.0 ACTINIC KERATOSIS: ICD-10-CM

## 2023-12-12 DIAGNOSIS — H61.11 ACQUIRED DEFORMITY OF PINNA: ICD-10-CM

## 2023-12-12 DIAGNOSIS — L20.89 OTHER ATOPIC DERMATITIS: ICD-10-CM

## 2023-12-12 DIAGNOSIS — D22 MELANOCYTIC NEVI: ICD-10-CM | Status: STABLE

## 2023-12-12 PROBLEM — H61.113 ACQUIRED DEFORMITY OF PINNA, BILATERAL: Status: ACTIVE | Noted: 2023-12-12

## 2023-12-12 PROBLEM — D22.5 MELANOCYTIC NEVI OF TRUNK: Status: ACTIVE | Noted: 2023-12-12

## 2023-12-12 PROCEDURE — ? COUNSELING

## 2023-12-12 PROCEDURE — ? LIQUID NITROGEN

## 2023-12-12 PROCEDURE — ? ADDITIONAL NOTES

## 2023-12-12 PROCEDURE — ? PRESCRIPTION MEDICATION MANAGEMENT

## 2023-12-12 PROCEDURE — 99213 OFFICE O/P EST LOW 20 MIN: CPT | Mod: 25

## 2023-12-12 PROCEDURE — ? MEDICAL PHOTOGRAPHY REVIEW

## 2023-12-12 PROCEDURE — 17003 DESTRUCT PREMALG LES 2-14: CPT | Mod: 59

## 2023-12-12 PROCEDURE — 17110 DESTRUCTION B9 LES UP TO 14: CPT

## 2023-12-12 PROCEDURE — 17000 DESTRUCT PREMALG LESION: CPT | Mod: 59

## 2023-12-12 PROCEDURE — ? OTHER

## 2023-12-12 PROCEDURE — ? FULL BODY SKIN EXAM

## 2023-12-12 ASSESSMENT — LOCATION SIMPLE DESCRIPTION DERM
LOCATION SIMPLE: LEFT EAR
LOCATION SIMPLE: RIGHT EYEBROW
LOCATION SIMPLE: UPPER BACK
LOCATION SIMPLE: LEFT FOREARM
LOCATION SIMPLE: RIGHT LOWER BACK
LOCATION SIMPLE: RIGHT FOREARM
LOCATION SIMPLE: NOSE
LOCATION SIMPLE: LEFT PRETIBIAL REGION
LOCATION SIMPLE: RIGHT FOREHEAD
LOCATION SIMPLE: RIGHT PRETIBIAL REGION
LOCATION SIMPLE: LEFT AXILLARY VAULT
LOCATION SIMPLE: RIGHT EAR

## 2023-12-12 ASSESSMENT — LOCATION DETAILED DESCRIPTION DERM
LOCATION DETAILED: RIGHT DISTAL DORSAL FOREARM
LOCATION DETAILED: RIGHT CENTRAL EYEBROW
LOCATION DETAILED: INFERIOR THORACIC SPINE
LOCATION DETAILED: LEFT AXILLARY VAULT
LOCATION DETAILED: NASAL DORSUM
LOCATION DETAILED: RIGHT SUPERIOR MEDIAL LOWER BACK
LOCATION DETAILED: LEFT PROXIMAL DORSAL FOREARM
LOCATION DETAILED: LEFT DISTAL PRETIBIAL REGION
LOCATION DETAILED: LEFT ANTERIOR EARLOBE
LOCATION DETAILED: RIGHT ANTERIOR EARLOBE
LOCATION DETAILED: RIGHT INFERIOR MEDIAL FOREHEAD
LOCATION DETAILED: RIGHT DISTAL PRETIBIAL REGION

## 2023-12-12 ASSESSMENT — LOCATION ZONE DERM
LOCATION ZONE: ARM
LOCATION ZONE: NOSE
LOCATION ZONE: LEG
LOCATION ZONE: AXILLAE
LOCATION ZONE: EAR
LOCATION ZONE: FACE
LOCATION ZONE: TRUNK

## 2023-12-12 NOTE — PROCEDURE: MIPS QUALITY
Detail Level: Detailed
Quality 110: Preventive Care And Screening: Influenza Immunization: Influenza Immunization Administered during Influenza season
Quality 47: Advance Care Plan: Advance care planning not documented, reason not otherwise specified.
Quality 226: Preventive Care And Screening: Tobacco Use: Screening And Cessation Intervention: Patient screened for tobacco use and is an ex/non-smoker
Quality 431: Preventive Care And Screening: Unhealthy Alcohol Use - Screening: Patient not identified as an unhealthy alcohol user when screened for unhealthy alcohol use using a systematic screening method
Quality 402: Tobacco Use And Help With Quitting Among Adolescents: Patient screened for tobacco and never smoked
Quality 130: Documentation Of Current Medications In The Medical Record: Current Medications Documented

## 2023-12-12 NOTE — PROCEDURE: LIQUID NITROGEN
Render Post-Care Instructions In Note?: no
Post-Care Instructions: I reviewed with the patient in detail post-care instructions. Patient is to wear sunprotection, and avoid picking at any of the treated lesions. Pt may apply Vaseline to crusted or scabbing areas.
Detail Level: Detailed
Number Of Freeze-Thaw Cycles: 3 freeze-thaw cycles
Show Applicator Variable?: Yes
Duration Of Freeze Thaw-Cycle (Seconds): 2
Consent: The patient's consent was obtained including but not limited to risks of crusting, scabbing, blistering, scarring, darker or lighter pigmentary change, recurrence, incomplete removal and infection.
Medical Necessity Information: It is in your best interest to select a reason for this procedure from the list below. All of these items fulfill various CMS LCD requirements except the new and changing color options.
Spray Paint Text: The liquid nitrogen was applied to the skin utilizing a spray paint frosting technique.
Medical Necessity Clause: This procedure was medically necessary because the lesions that were treated were:

## 2023-12-12 NOTE — PROCEDURE: ADDITIONAL NOTES
Render Risk Assessment In Note?: no
Detail Level: Simple
Additional Notes: Advised patient this can be done by Dr. Juarez, or Dr. Blakemore. \\nAdvised patient this is not covered by insurance.

## 2023-12-12 NOTE — PROCEDURE: PRESCRIPTION MEDICATION MANAGEMENT
Render In Strict Bullet Format?: No
Continue Regimen: Desoximetasone 0.25% ointment apply twice daily as needed for flares, patient does not need refills at this time.\\nDiscussed side effects and appropriate use of topical steroids.
Detail Level: Zone

## 2024-01-11 ENCOUNTER — APPOINTMENT (RX ONLY)
Dept: URBAN - METROPOLITAN AREA CLINIC 329 | Facility: CLINIC | Age: 79
Setting detail: DERMATOLOGY
End: 2024-01-11

## 2024-01-11 DIAGNOSIS — H61.11 ACQUIRED DEFORMITY OF PINNA: ICD-10-CM

## 2024-01-11 PROBLEM — H61.113 ACQUIRED DEFORMITY OF PINNA, BILATERAL: Status: ACTIVE | Noted: 2024-01-11

## 2024-01-11 PROCEDURE — ? COUNSELING

## 2024-01-11 PROCEDURE — 99212 OFFICE O/P EST SF 10 MIN: CPT

## 2024-01-11 PROCEDURE — ? DEFER

## 2024-01-11 ASSESSMENT — LOCATION DETAILED DESCRIPTION DERM
LOCATION DETAILED: RIGHT ANTERIOR EARLOBE
LOCATION DETAILED: LEFT ANTERIOR EARLOBE

## 2024-01-11 ASSESSMENT — LOCATION SIMPLE DESCRIPTION DERM
LOCATION SIMPLE: LEFT EAR
LOCATION SIMPLE: RIGHT EAR

## 2024-01-11 ASSESSMENT — LOCATION ZONE DERM: LOCATION ZONE: EAR

## 2024-01-11 NOTE — HPI: SURGICAL CONSULTATION
What Is The Location Of The Lesion?: Right and left ear
Who Is Your Referring Physician?: Dr. Cooper
Additional History: Pt requesting appt for consultation about earlobe repair and repiercing.

## 2024-01-11 NOTE — PROCEDURE: DEFER
X Size Of Lesion In Cm (Optional): 0
Instructions (Optional): Pt will need to defer earlobe repair and piercing for a later date. Pt can schedule for both ears at the same time. Surgery slot will need to be a 40-minute slot.
Detail Level: Detailed
Introduction Text (Please End With A Colon): The following procedure was deferred:

## 2024-04-30 ENCOUNTER — APPOINTMENT (RX ONLY)
Dept: URBAN - METROPOLITAN AREA CLINIC 329 | Facility: CLINIC | Age: 79
Setting detail: DERMATOLOGY
End: 2024-04-30

## 2024-04-30 DIAGNOSIS — H61.11 ACQUIRED DEFORMITY OF PINNA: ICD-10-CM

## 2024-04-30 PROBLEM — H61.111 ACQUIRED DEFORMITY OF PINNA, RIGHT EAR: Status: ACTIVE | Noted: 2024-04-30

## 2024-04-30 PROBLEM — H61.112 ACQUIRED DEFORMITY OF PINNA, LEFT EAR: Status: ACTIVE | Noted: 2024-04-30

## 2024-04-30 PROCEDURE — ? EARLOBE REPAIR COSMETIC

## 2024-04-30 PROCEDURE — ? COUNSELING

## 2024-04-30 ASSESSMENT — LOCATION ZONE DERM: LOCATION ZONE: EAR

## 2024-04-30 ASSESSMENT — LOCATION DETAILED DESCRIPTION DERM
LOCATION DETAILED: LEFT ANTERIOR EARLOBE
LOCATION DETAILED: RIGHT ANTERIOR EARLOBE

## 2024-04-30 ASSESSMENT — LOCATION SIMPLE DESCRIPTION DERM
LOCATION SIMPLE: RIGHT EAR
LOCATION SIMPLE: LEFT EAR

## 2024-04-30 NOTE — PROCEDURE: EARLOBE REPAIR COSMETIC
Price (Use Numbers Only, No Special Characters Or $): 300
Detail Level: Detailed
Anesthesia Type: 3% carbocaine
Hemostasis: Electrocautery
Estimated Blood Loss (Cc): minimal
Repair Type: Intermediate Layered Repair
Include Undermining In The Note?: Yes
Deep Sutures: 5-0 Monocryl
Epidermal Sutures: 6-0 Prolene
Epidermal Closure: simple interrupted
Wound Length (In Cm): 0
Suture Removal: 7 days
Wound Care: Petrolatum
Consent: The rationale for Repairs was explained to the patient and consent was obtained. The risks, benefits and alternatives to therapy were discussed in detail. Specifically, the risks of infection, scarring, bleeding, prolonged wound healing, incomplete removal, allergy to anesthesia, nerve injury and recurrence were addressed. Prior to the procedure, the treatment site was clearly identified and confirmed by the patient. All components of Universal Protocol/PAUSE Rule completed.
Post-Care Instructions: I reviewed with the patient in detail post-care instructions. Patient is not to engage in any heavy lifting, exercise, or swimming for the next 14 days. Should the patient develop any fevers, chills, bleeding, severe pain patient will contact the office immediately.
Deep Sutures: 5-0 Vicryl
Epidermal Sutures: 6-0 Ethilon

## 2024-05-08 ENCOUNTER — APPOINTMENT (RX ONLY)
Dept: URBAN - METROPOLITAN AREA CLINIC 329 | Facility: CLINIC | Age: 79
Setting detail: DERMATOLOGY
End: 2024-05-08

## 2024-05-08 DIAGNOSIS — Z48.02 ENCOUNTER FOR REMOVAL OF SUTURES: ICD-10-CM

## 2024-05-08 PROCEDURE — ? SUTURE REMOVAL

## 2024-05-08 ASSESSMENT — LOCATION ZONE DERM: LOCATION ZONE: EAR

## 2024-05-08 ASSESSMENT — LOCATION SIMPLE DESCRIPTION DERM: LOCATION SIMPLE: RIGHT EAR

## 2024-05-08 ASSESSMENT — LOCATION DETAILED DESCRIPTION DERM: LOCATION DETAILED: RIGHT ANTERIOR EARLOBE

## 2024-06-04 ENCOUNTER — APPOINTMENT (RX ONLY)
Dept: URBAN - METROPOLITAN AREA CLINIC 330 | Facility: CLINIC | Age: 79
Setting detail: DERMATOLOGY
End: 2024-06-04

## 2024-06-04 DIAGNOSIS — L60.3 NAIL DYSTROPHY: ICD-10-CM

## 2024-06-04 DIAGNOSIS — L20.89 OTHER ATOPIC DERMATITIS: ICD-10-CM

## 2024-06-04 PROCEDURE — ? PRESCRIPTION MEDICATION MANAGEMENT

## 2024-06-04 PROCEDURE — ? COUNSELING

## 2024-06-04 PROCEDURE — 99213 OFFICE O/P EST LOW 20 MIN: CPT

## 2024-06-04 PROCEDURE — ? PRESCRIPTION

## 2024-06-04 RX ORDER — TAVABOROLE 43.5 MG/ML
SOLUTION TOPICAL
Qty: 10 | Refills: 5 | Status: ERX | COMMUNITY
Start: 2024-06-04

## 2024-06-04 RX ADMIN — TAVABOROLE: 43.5 SOLUTION TOPICAL at 00:00

## 2024-06-04 ASSESSMENT — LOCATION DETAILED DESCRIPTION DERM
LOCATION DETAILED: RIGHT INFERIOR FOREHEAD
LOCATION DETAILED: LEFT DISTAL DORSAL INDEX FINGER
LOCATION DETAILED: RIGHT CENTRAL EYEBROW

## 2024-06-04 ASSESSMENT — LOCATION ZONE DERM
LOCATION ZONE: FACE
LOCATION ZONE: FACE
LOCATION ZONE: FINGER

## 2024-06-04 ASSESSMENT — LOCATION SIMPLE DESCRIPTION DERM
LOCATION SIMPLE: RIGHT EYEBROW
LOCATION SIMPLE: LEFT INDEX FINGER
LOCATION SIMPLE: RIGHT FOREHEAD

## 2024-06-04 NOTE — PROCEDURE: PRESCRIPTION MEDICATION MANAGEMENT
Initiate Treatment: tavaborole 5 % topical solution with applicator, will be sent to [facility].\\nApply to affected nails once daily x 46 weeks.
Render In Strict Bullet Format?: No
Plan: Discussed topical fungal solution with application, \\nAdvised patient cutting  her nail straight out not in an les and to massage the nail fold away from the nail.
Detail Level: Simple
Plan: Use triamcinolone

## 2024-09-09 ENCOUNTER — APPOINTMENT (RX ONLY)
Dept: URBAN - METROPOLITAN AREA CLINIC 330 | Facility: CLINIC | Age: 79
Setting detail: DERMATOLOGY
End: 2024-09-09

## 2024-09-09 DIAGNOSIS — D22 MELANOCYTIC NEVI: ICD-10-CM

## 2024-09-09 DIAGNOSIS — L57.0 ACTINIC KERATOSIS: ICD-10-CM

## 2024-09-09 DIAGNOSIS — L81.4 OTHER MELANIN HYPERPIGMENTATION: ICD-10-CM

## 2024-09-09 DIAGNOSIS — L60.3 NAIL DYSTROPHY: ICD-10-CM | Status: RESOLVED

## 2024-09-09 DIAGNOSIS — L20.89 OTHER ATOPIC DERMATITIS: ICD-10-CM | Status: INADEQUATELY CONTROLLED

## 2024-09-09 PROBLEM — D22.5 MELANOCYTIC NEVI OF TRUNK: Status: ACTIVE | Noted: 2024-09-09

## 2024-09-09 PROCEDURE — ? COUNSELING

## 2024-09-09 PROCEDURE — ? MEDICAL PHOTOGRAPHY REVIEW

## 2024-09-09 PROCEDURE — ? LIQUID NITROGEN

## 2024-09-09 PROCEDURE — ? OTHER

## 2024-09-09 PROCEDURE — ? MDM - TREATMENT GOALS

## 2024-09-09 PROCEDURE — ? FULL BODY SKIN EXAM

## 2024-09-09 PROCEDURE — ? PRESCRIPTION MEDICATION MANAGEMENT

## 2024-09-09 PROCEDURE — ? PRESCRIPTION

## 2024-09-09 PROCEDURE — ? PHOTO-DOCUMENTATION

## 2024-09-09 PROCEDURE — 99214 OFFICE O/P EST MOD 30 MIN: CPT | Mod: 25

## 2024-09-09 PROCEDURE — 17003 DESTRUCT PREMALG LES 2-14: CPT

## 2024-09-09 PROCEDURE — 17000 DESTRUCT PREMALG LESION: CPT

## 2024-09-09 RX ORDER — CLOBETASOL PROPIONATE 0.5 MG/G
CREAM TOPICAL
Qty: 60 | Refills: 1 | Status: ERX | COMMUNITY
Start: 2024-09-09

## 2024-09-09 RX ADMIN — CLOBETASOL PROPIONATE: 0.5 CREAM TOPICAL at 00:00

## 2024-09-09 ASSESSMENT — LOCATION ZONE DERM
LOCATION ZONE: FACE
LOCATION ZONE: NECK
LOCATION ZONE: FACE
LOCATION ZONE: NOSE
LOCATION ZONE: TRUNK
LOCATION ZONE: FINGER

## 2024-09-09 ASSESSMENT — LOCATION SIMPLE DESCRIPTION DERM
LOCATION SIMPLE: LEFT INDEX FINGER
LOCATION SIMPLE: UPPER BACK
LOCATION SIMPLE: LEFT CHEEK
LOCATION SIMPLE: POSTERIOR NECK
LOCATION SIMPLE: RIGHT NOSE
LOCATION SIMPLE: RIGHT EYEBROW
LOCATION SIMPLE: RIGHT FOREHEAD
LOCATION SIMPLE: RIGHT TEMPLE
LOCATION SIMPLE: RIGHT LOWER BACK

## 2024-09-09 ASSESSMENT — BSA RASH: BSA RASH: 5

## 2024-09-09 ASSESSMENT — ITCH NUMERIC RATING SCALE: HOW SEVERE IS YOUR ITCHING?: 2

## 2024-09-09 ASSESSMENT — LOCATION DETAILED DESCRIPTION DERM
LOCATION DETAILED: RIGHT INFERIOR MEDIAL FOREHEAD
LOCATION DETAILED: LEFT MEDIAL MALAR CHEEK
LOCATION DETAILED: LEFT INFERIOR CENTRAL MALAR CHEEK
LOCATION DETAILED: MID POSTERIOR NECK
LOCATION DETAILED: RIGHT NASAL SIDEWALL
LOCATION DETAILED: RIGHT FOREHEAD
LOCATION DETAILED: RIGHT CENTRAL EYEBROW
LOCATION DETAILED: RIGHT MEDIAL FOREHEAD
LOCATION DETAILED: INFERIOR THORACIC SPINE
LOCATION DETAILED: RIGHT SUPERIOR MEDIAL LOWER BACK
LOCATION DETAILED: LEFT DISTAL DORSAL INDEX FINGER
LOCATION DETAILED: RIGHT MID TEMPLE

## 2024-09-09 ASSESSMENT — PAIN INTENSITY VAS: HOW INTENSE IS YOUR PAIN 0 BEING NO PAIN, 10 BEING THE MOST SEVERE PAIN POSSIBLE?: 1/10 PAIN

## 2024-09-09 ASSESSMENT — SEVERITY ASSESSMENT 2020: SEVERITY 2020: MILD

## 2024-09-09 NOTE — PROCEDURE: OTHER
Other (Free Text): Patient consent was obtained to proceed with the visit and recommended plan of care after discussion of all risks and benefits, including the risks of COVID-19 exposure.
Note Text (......Xxx Chief Complaint.): This diagnosis correlates with the
Render Risk Assessment In Note?: yes
Detail Level: Generalized
Other (Free Text): Patient state she bruised it.
Detail Level: Simple

## 2024-09-09 NOTE — PROCEDURE: PRESCRIPTION MEDICATION MANAGEMENT
Initiate Treatment: tavaborole 5 % topical solution with applicator, will be sent to [facility].\\nApply to affected nails once daily x 46 weeks.
Render In Strict Bullet Format?: No
Plan: Patient never got tavaborole 5 % topical solution, patient started cutting  her nail  straight out not in an les and to massage the nail fold away from the nail.
Detail Level: Simple
Initiate Treatment: clobetasol 0.05 % topical cream \\nApply to rash on Neck/scalp twice daily  x 1-2 weeks prn for flares (avoid the Folds).\\nDiscussed side effected and appropriate uses of topical steroid.

## 2024-09-09 NOTE — PROCEDURE: LIQUID NITROGEN
Post-Care Instructions: I reviewed with the patient in detail post-care instructions. Patient is to wear sunprotection, and avoid picking at any of the treated lesions. Pt may apply Vaseline to crusted or scabbing areas.
Duration Of Freeze Thaw-Cycle (Seconds): 2
Number Of Freeze-Thaw Cycles: 3 freeze-thaw cycles
Detail Level: Detailed
Render Post-Care Instructions In Note?: no
Show Aperture Variable?: Yes
Consent: The patient's consent was obtained including but not limited to risks of crusting, scabbing, blistering, scarring, darker or lighter pigmentary change, recurrence, incomplete removal and infection.

## 2024-10-17 ENCOUNTER — APPOINTMENT (RX ONLY)
Dept: URBAN - METROPOLITAN AREA CLINIC 329 | Facility: CLINIC | Age: 79
Setting detail: DERMATOLOGY
End: 2024-10-17

## 2024-10-17 DIAGNOSIS — Z41.3 ENCOUNTER FOR EAR PIERCING: ICD-10-CM

## 2024-10-17 PROCEDURE — ? PIERCING

## 2024-10-17 ASSESSMENT — LOCATION SIMPLE DESCRIPTION DERM
LOCATION SIMPLE: LEFT EAR
LOCATION SIMPLE: RIGHT EAR

## 2024-10-17 ASSESSMENT — LOCATION ZONE DERM: LOCATION ZONE: EAR

## 2024-10-17 ASSESSMENT — LOCATION DETAILED DESCRIPTION DERM
LOCATION DETAILED: RIGHT ANTERIOR EARLOBE
LOCATION DETAILED: LEFT ANTERIOR EARLOBE

## 2024-10-17 NOTE — PROCEDURE: PIERCING
Price (Use Numbers Only, No Special Characters Or $): 125.00
Wound Care: Petrolatum
Piercing Tool: Anila
Post-Care Instructions: I reviewed with the patient in detail post-care instructions. Patient is to keep the ears clean and dry for 6 weeks. Earrings should be kept in for 6 weeks continuously (12 weeks for cartilage piercing) before removing.  Also recommended cleaning the piercing site daily with alcohol and a cotton-tipped applicator. Should the patient develop any piercing site reactions or pain patient will contact the office immediately.
Consent was obtained from the patient. The risks of the procedure were discussed in detail. Specifically, the risks of infection, scarring, bleeding, prolonged wound healing, pain and allergic reaction were addressed. Prior to the procedure, the piercing site was clearly identified and confirmed by the patient.
Detail Level: Detailed

## 2024-10-17 NOTE — HPI: COSMETIC (PIERCING)
[id number]: previous_has_had_previous_piercings
Additional History: Patient is here today for some new earrings.